# Patient Record
Sex: MALE | Race: WHITE
[De-identification: names, ages, dates, MRNs, and addresses within clinical notes are randomized per-mention and may not be internally consistent; named-entity substitution may affect disease eponyms.]

---

## 2020-11-04 ENCOUNTER — HOSPITAL ENCOUNTER (INPATIENT)
Dept: HOSPITAL 83 - ED | Age: 55
LOS: 2 days | Discharge: NURSING, REHAB OR LONG-TERM CARE | DRG: 189 | End: 2020-11-06
Attending: INTERNAL MEDICINE | Admitting: INTERNAL MEDICINE
Payer: MEDICARE

## 2020-11-04 VITALS — DIASTOLIC BLOOD PRESSURE: 75 MMHG

## 2020-11-04 VITALS — BODY MASS INDEX: 31.64 KG/M2 | WEIGHT: 201.56 LBS | HEIGHT: 66.97 IN

## 2020-11-04 VITALS — DIASTOLIC BLOOD PRESSURE: 77 MMHG

## 2020-11-04 DIAGNOSIS — Z99.81: ICD-10-CM

## 2020-11-04 DIAGNOSIS — R73.9: ICD-10-CM

## 2020-11-04 DIAGNOSIS — Z87.891: ICD-10-CM

## 2020-11-04 DIAGNOSIS — J44.1: ICD-10-CM

## 2020-11-04 DIAGNOSIS — Z82.3: ICD-10-CM

## 2020-11-04 DIAGNOSIS — Z82.49: ICD-10-CM

## 2020-11-04 DIAGNOSIS — F32.9: ICD-10-CM

## 2020-11-04 DIAGNOSIS — Z20.828: ICD-10-CM

## 2020-11-04 DIAGNOSIS — I10: ICD-10-CM

## 2020-11-04 DIAGNOSIS — E66.9: ICD-10-CM

## 2020-11-04 DIAGNOSIS — E44.0: ICD-10-CM

## 2020-11-04 DIAGNOSIS — J96.22: ICD-10-CM

## 2020-11-04 DIAGNOSIS — E87.8: ICD-10-CM

## 2020-11-04 DIAGNOSIS — D53.9: ICD-10-CM

## 2020-11-04 DIAGNOSIS — J96.02: ICD-10-CM

## 2020-11-04 DIAGNOSIS — E87.3: ICD-10-CM

## 2020-11-04 DIAGNOSIS — J96.01: Primary | ICD-10-CM

## 2020-11-04 DIAGNOSIS — Z79.899: ICD-10-CM

## 2020-11-04 LAB
ALBUMIN SERPL-MCNC: 3.3 GM/DL (ref 3.1–4.5)
ALP SERPL-CCNC: 71 U/L (ref 45–117)
ALT SERPL W P-5'-P-CCNC: 32 U/L (ref 12–78)
AST SERPL-CCNC: 28 IU/L (ref 3–35)
BASOPHILS # BLD AUTO: 0 10*3/UL (ref 0–0.1)
BASOPHILS NFR BLD AUTO: 0.4 % (ref 0–1)
BUN SERPL-MCNC: 12 MG/DL (ref 7–24)
CHLORIDE SERPL-SCNC: 95 MMOL/L (ref 98–107)
CREAT SERPL-MCNC: 0.65 MG/DL (ref 0.7–1.3)
EOSINOPHIL # BLD AUTO: 0.3 10*3/UL (ref 0–0.4)
EOSINOPHIL # BLD AUTO: 4.2 % (ref 1–4)
ERYTHROCYTE [DISTWIDTH] IN BLOOD BY AUTOMATED COUNT: 13.4 % (ref 0–14.5)
HCT VFR BLD AUTO: 35.6 % (ref 42–52)
INR BLD: 0.9 (ref 2–3.5)
LYMPHOCYTES # BLD AUTO: 2.1 10*3/UL (ref 1.3–4.4)
LYMPHOCYTES NFR BLD AUTO: 25.3 % (ref 27–41)
MCH RBC QN AUTO: 28.7 PG (ref 27–31)
MCHC RBC AUTO-ENTMCNC: 29.2 G/DL (ref 33–37)
MCV RBC AUTO: 98.1 FL (ref 80–94)
MONOCYTES # BLD AUTO: 0.8 10*3/UL (ref 0.1–1)
MONOCYTES NFR BLD MANUAL: 9.6 % (ref 3–9)
NEUT #: 4.9 10*3/UL (ref 2.3–7.9)
NEUT %: 60.4 % (ref 47–73)
NRBC BLD QL AUTO: 0 % (ref 0–0)
PLATELET # BLD AUTO: 247 10*3/UL (ref 130–400)
PMV BLD AUTO: 10.6 FL (ref 9.6–12.3)
POTASSIUM SERPL-SCNC: 4.4 MMOL/L (ref 3.5–5.1)
PROT SERPL-MCNC: 6.7 GM/DL (ref 6.4–8.2)
RBC # BLD AUTO: 3.63 10*6/UL (ref 4.5–5.9)
SODIUM SERPL-SCNC: 140 MMOL/L (ref 136–145)
TROPONIN I SERPL-MCNC: < 0.015 NG/ML (ref ?–0.04)
WBC NRBC COR # BLD AUTO: 8.1 10*3/UL (ref 4.8–10.8)

## 2020-11-05 VITALS — DIASTOLIC BLOOD PRESSURE: 66 MMHG

## 2020-11-05 VITALS — SYSTOLIC BLOOD PRESSURE: 94 MMHG | DIASTOLIC BLOOD PRESSURE: 71 MMHG

## 2020-11-05 VITALS — DIASTOLIC BLOOD PRESSURE: 71 MMHG

## 2020-11-05 VITALS — DIASTOLIC BLOOD PRESSURE: 44 MMHG

## 2020-11-05 VITALS — DIASTOLIC BLOOD PRESSURE: 83 MMHG

## 2020-11-05 LAB
BASE EXCESS BLDA CALC-SCNC: 13.8 MMOL/L (ref -2–2)
BASE EXCESS BLDA CALC-SCNC: 14.6 MMOL/L (ref -2–2)
BUN SERPL-MCNC: 12 MG/DL (ref 7–24)
CHLORIDE SERPL-SCNC: 92 MMOL/L (ref 98–107)
CREAT SERPL-MCNC: 0.74 MG/DL (ref 0.7–1.3)
ERYTHROCYTE [DISTWIDTH] IN BLOOD BY AUTOMATED COUNT: 13.6 % (ref 0–14.5)
HCT VFR BLD AUTO: 35.2 % (ref 42–52)
MACROCYTES BLD QL SMEAR: SLIGHT
MCH RBC QN AUTO: 28.6 PG (ref 27–31)
MCHC RBC AUTO-ENTMCNC: 29.3 G/DL (ref 33–37)
MCV RBC AUTO: 97.8 FL (ref 80–94)
NRBC BLD QL AUTO: 0 10*3/UL (ref 0–0)
PCO2 BLDA: 78 MMHG (ref 35–45)
PCO2 BLDA: 92 MMHG (ref 35–45)
PH BLDA: 7.3 [PH] (ref 7.35–7.45)
PH BLDA: 7.35 [PH] (ref 7.35–7.45)
PLATELET # BLD AUTO: 246 10*3/UL (ref 130–400)
PLATELET SUFFICIENCY: NORMAL
PMV BLD AUTO: 10.8 FL (ref 9.6–12.3)
PO2 BLDA: 75 MMHG (ref 80–90)
PO2 BLDA: 85 MMHG (ref 80–90)
POTASSIUM SERPL-SCNC: 4.4 MMOL/L (ref 3.5–5.1)
RBC # BLD AUTO: 3.6 10*6/UL (ref 4.5–5.9)
SAO2 % BLDA: 96 % (ref 95–97)
SAO2 % BLDA: 96 % (ref 95–97)
SODIUM SERPL-SCNC: 137 MMOL/L (ref 136–145)
TOTAL CELLS COUNTED: 100 #CELLS
VITAMIN B12: 217 PG/ML (ref 247–911)
WBC NRBC COR # BLD AUTO: 6.3 10*3/UL (ref 4.8–10.8)

## 2020-11-06 VITALS — SYSTOLIC BLOOD PRESSURE: 125 MMHG | DIASTOLIC BLOOD PRESSURE: 75 MMHG

## 2020-11-06 VITALS — DIASTOLIC BLOOD PRESSURE: 56 MMHG

## 2020-11-06 LAB
BASE EXCESS BLDA CALC-SCNC: 15.7 MMOL/L (ref -2–2)
BASOPHILS # BLD AUTO: 0 10*3/UL (ref 0–0.1)
BASOPHILS NFR BLD AUTO: 0.2 % (ref 0–1)
BUN SERPL-MCNC: 15 MG/DL (ref 7–24)
CHLORIDE SERPL-SCNC: 95 MMOL/L (ref 98–107)
CREAT SERPL-MCNC: 0.63 MG/DL (ref 0.7–1.3)
EOSINOPHIL # BLD AUTO: 0 10*3/UL (ref 0–0.4)
EOSINOPHIL # BLD AUTO: 0.3 % (ref 1–4)
ERYTHROCYTE [DISTWIDTH] IN BLOOD BY AUTOMATED COUNT: 13.7 % (ref 0–14.5)
HCT VFR BLD AUTO: 34 % (ref 42–52)
LYMPHOCYTES # BLD AUTO: 2.1 10*3/UL (ref 1.3–4.4)
LYMPHOCYTES NFR BLD AUTO: 17.9 % (ref 27–41)
MCH RBC QN AUTO: 28.2 PG (ref 27–31)
MCHC RBC AUTO-ENTMCNC: 28.5 G/DL (ref 33–37)
MCV RBC AUTO: 98.8 FL (ref 80–94)
MONOCYTES # BLD AUTO: 1.2 10*3/UL (ref 0.1–1)
MONOCYTES NFR BLD MANUAL: 10.2 % (ref 3–9)
NEUT #: 8.4 10*3/UL (ref 2.3–7.9)
NEUT %: 71 % (ref 47–73)
NRBC BLD QL AUTO: 0 % (ref 0–0)
PCO2 BLDA: 89 MMHG (ref 35–45)
PH BLDA: 7.32 [PH] (ref 7.35–7.45)
PLATELET # BLD AUTO: 257 10*3/UL (ref 130–400)
PMV BLD AUTO: 11.2 FL (ref 9.6–12.3)
PO2 BLDA: 68 MMHG (ref 80–90)
POTASSIUM SERPL-SCNC: 4.3 MMOL/L (ref 3.5–5.1)
RBC # BLD AUTO: 3.44 10*6/UL (ref 4.5–5.9)
SAO2 % BLDA: 94 % (ref 95–97)
SODIUM SERPL-SCNC: 141 MMOL/L (ref 136–145)
WBC NRBC COR # BLD AUTO: 11.8 10*3/UL (ref 4.8–10.8)

## 2020-11-06 PROCEDURE — 5A09357 ASSISTANCE WITH RESPIRATORY VENTILATION, LESS THAN 24 CONSECUTIVE HOURS, CONTINUOUS POSITIVE AIRWAY PRESSURE: ICD-10-PCS | Performed by: INTERNAL MEDICINE

## 2020-11-07 ENCOUNTER — HOSPITAL ENCOUNTER (EMERGENCY)
Dept: HOSPITAL 83 - ED | Age: 55
LOS: 1 days | Discharge: OTHER INSTITUTION | End: 2020-11-08
Payer: MEDICARE

## 2020-11-07 VITALS — WEIGHT: 226 LBS | HEIGHT: 71.97 IN | BODY MASS INDEX: 30.61 KG/M2

## 2020-11-07 DIAGNOSIS — Z87.891: ICD-10-CM

## 2020-11-07 DIAGNOSIS — Z79.899: ICD-10-CM

## 2020-11-07 DIAGNOSIS — J44.9: Primary | ICD-10-CM

## 2020-11-25 ENCOUNTER — HOSPITAL ENCOUNTER (OUTPATIENT)
Dept: HOSPITAL 83 - ED | Age: 55
Setting detail: OBSERVATION
LOS: 6 days | Discharge: NURSING, REHAB OR LONG-TERM CARE | End: 2020-12-01
Attending: INTERNAL MEDICINE | Admitting: INTERNAL MEDICINE
Payer: MEDICARE

## 2020-11-25 VITALS — SYSTOLIC BLOOD PRESSURE: 129 MMHG | DIASTOLIC BLOOD PRESSURE: 75 MMHG

## 2020-11-25 VITALS — DIASTOLIC BLOOD PRESSURE: 75 MMHG

## 2020-11-25 VITALS — DIASTOLIC BLOOD PRESSURE: 67 MMHG

## 2020-11-25 VITALS — HEIGHT: 70.87 IN | WEIGHT: 200.62 LBS | BODY MASS INDEX: 28.09 KG/M2

## 2020-11-25 VITALS — DIASTOLIC BLOOD PRESSURE: 69 MMHG

## 2020-11-25 DIAGNOSIS — J96.22: ICD-10-CM

## 2020-11-25 DIAGNOSIS — E44.0: ICD-10-CM

## 2020-11-25 DIAGNOSIS — E87.8: ICD-10-CM

## 2020-11-25 DIAGNOSIS — R55: ICD-10-CM

## 2020-11-25 DIAGNOSIS — R06.89: ICD-10-CM

## 2020-11-25 DIAGNOSIS — J44.1: Primary | ICD-10-CM

## 2020-11-25 DIAGNOSIS — I10: ICD-10-CM

## 2020-11-25 DIAGNOSIS — E83.41: ICD-10-CM

## 2020-11-25 DIAGNOSIS — F32.9: ICD-10-CM

## 2020-11-25 DIAGNOSIS — Z99.81: ICD-10-CM

## 2020-11-25 DIAGNOSIS — D53.9: ICD-10-CM

## 2020-11-25 LAB
ALBUMIN SERPL-MCNC: 3.1 GM/DL (ref 3.1–4.5)
ALP SERPL-CCNC: 80 U/L (ref 45–117)
ALT SERPL W P-5'-P-CCNC: 22 U/L (ref 12–78)
APTT PPP: 20.8 SECONDS (ref 20–32.1)
AST SERPL-CCNC: 17 IU/L (ref 3–35)
BACTERIA #/AREA URNS HPF: (no result) /[HPF]
BASE EXCESS BLDA CALC-SCNC: 14 MMOL/L (ref -2–2)
BASE EXCESS BLDA CALC-SCNC: 16.6 MMOL/L (ref -2–2)
BASOPHILS # BLD AUTO: 0 10*3/UL (ref 0–0.1)
BASOPHILS NFR BLD AUTO: 0.2 % (ref 0–1)
BUN SERPL-MCNC: 17 MG/DL (ref 7–24)
CHLORIDE SERPL-SCNC: 95 MMOL/L (ref 98–107)
CREAT SERPL-MCNC: 0.78 MG/DL (ref 0.7–1.3)
EOSINOPHIL # BLD AUTO: 0.1 10*3/UL (ref 0–0.4)
EOSINOPHIL # BLD AUTO: 0.6 % (ref 1–4)
EPI CELLS #/AREA URNS HPF: (no result) /[HPF]
ERYTHROCYTE [DISTWIDTH] IN BLOOD BY AUTOMATED COUNT: 13.7 % (ref 0–14.5)
HCT VFR BLD AUTO: 34.7 % (ref 42–52)
INR BLD: 0.9 (ref 2–3.5)
LEUKOCYTE ESTERASE UR QL STRIP: (no result)
LYMPHOCYTES # BLD AUTO: 1.3 10*3/UL (ref 1.3–4.4)
LYMPHOCYTES NFR BLD AUTO: 12.4 % (ref 27–41)
MCH RBC QN AUTO: 28.7 PG (ref 27–31)
MCHC RBC AUTO-ENTMCNC: 28.8 G/DL (ref 33–37)
MCV RBC AUTO: 99.4 FL (ref 80–94)
MONOCYTES # BLD AUTO: 0.8 10*3/UL (ref 0.1–1)
MONOCYTES NFR BLD MANUAL: 7.9 % (ref 3–9)
MUCOUS THREADS URNS QL MICRO: (no result)
NEUT #: 8.3 10*3/UL (ref 2.3–7.9)
NEUT %: 78.7 % (ref 47–73)
NRBC BLD QL AUTO: 0 10*3/UL (ref 0–0)
PCO2 BLDA: 77 MMHG (ref 35–45)
PCO2 BLDA: 93 MMHG (ref 35–45)
PH BLDA: 7.32 [PH] (ref 7.35–7.45)
PH BLDA: 7.36 [PH] (ref 7.35–7.45)
PH UR STRIP: 7.5 [PH] (ref 4.5–8)
PLATELET # BLD AUTO: 217 10*3/UL (ref 130–400)
PMV BLD AUTO: 10.8 FL (ref 9.6–12.3)
PO2 BLDA: 57 MMHG (ref 80–90)
PO2 BLDA: 83 MMHG (ref 80–90)
POTASSIUM SERPL-SCNC: 4.3 MMOL/L (ref 3.5–5.1)
PROT SERPL-MCNC: 6.8 GM/DL (ref 6.4–8.2)
RBC # BLD AUTO: 3.49 10*6/UL (ref 4.5–5.9)
RBC #/AREA URNS HPF: (no result) RBC/HPF (ref 0–2)
SAO2 % BLDA: 91 % (ref 95–97)
SAO2 % BLDA: 95 % (ref 95–97)
SODIUM SERPL-SCNC: 140 MMOL/L (ref 136–145)
SP GR UR: >= 1.03 (ref 1–1.03)
TROPONIN I SERPL-MCNC: < 0.015 NG/ML (ref ?–0.04)
UROBILINOGEN UR STRIP-MCNC: 1 E.U./DL (ref 0–1)
WBC #/AREA URNS HPF: (no result) WBC/HPF (ref 0–5)
WBC NRBC COR # BLD AUTO: 10.5 10*3/UL (ref 4.8–10.8)

## 2020-11-25 NOTE — NUR
PT RETURNED FROM CT SCAN. CALLED TO PLACED PT ON BIPAP. PT PLACED ON BIPAP.
TOLERATING WELL. RESPS REGULAR AND UNLABORED.

## 2020-11-25 NOTE — NUR
THE PATIENT REMOVED THE CARDIAC LEADS. WHEN I ASKED HIM WHY HE SAID "CAUSE I
WANTED TOO."  HE THEN POINTED TO THE BIPAP AND SAID "I'M READY TO TAKE THIS
THING OFF TOO."

## 2020-11-25 NOTE — NUR
A 55, admitted to 5E, under the
services of SYMONE Giron DO with a diagnosis of COPD EXACERBATION.
Chief complaint is SOB.
Patient arrived via bed from ER.
Monitor applied. Initial assessment completed.
Vital signs taken and recorded.
SYMONE GIRON DO notified of admission to the unit.
Orders received.
See assessment for past medical history, medications
and allergies.
Patient and/or family oriented to unit. [facility]
visitation policy reviewed.
Clothing/patient valuable form completed.
 
AYDEN POST

## 2020-11-25 NOTE — NUR
PT PLACED ON BIPAP AT THIS TIME. PT TOLERATING WELL. RESPS REGULAR AND
UNLABORED. PARAMETERS: 15/5 40%. SPO2 96% HR 86 .

## 2020-11-25 NOTE — NUR
PT REFUSING TO WEAR CARDIAC MONITOR AND BIPAP AT THIS TIME. PT EDUCATED ON
IMPORTANCE OF BIPAP AND STATES HE WILL WEAR IT TONIGHT TO SLEEP.

## 2020-11-26 VITALS — SYSTOLIC BLOOD PRESSURE: 130 MMHG | DIASTOLIC BLOOD PRESSURE: 76 MMHG

## 2020-11-26 VITALS — SYSTOLIC BLOOD PRESSURE: 119 MMHG | DIASTOLIC BLOOD PRESSURE: 69 MMHG

## 2020-11-26 VITALS — DIASTOLIC BLOOD PRESSURE: 71 MMHG | SYSTOLIC BLOOD PRESSURE: 135 MMHG

## 2020-11-26 VITALS — SYSTOLIC BLOOD PRESSURE: 134 MMHG | DIASTOLIC BLOOD PRESSURE: 76 MMHG

## 2020-11-26 VITALS — SYSTOLIC BLOOD PRESSURE: 102 MMHG | DIASTOLIC BLOOD PRESSURE: 58 MMHG

## 2020-11-26 LAB
BASE EXCESS BLDA CALC-SCNC: 15.1 MMOL/L (ref -2–2)
BASOPHILS # BLD AUTO: 0 10*3/UL (ref 0–0.1)
BASOPHILS NFR BLD AUTO: 0.1 % (ref 0–1)
BUN SERPL-MCNC: 15 MG/DL (ref 7–24)
CHLORIDE SERPL-SCNC: 94 MMOL/L (ref 98–107)
CREAT SERPL-MCNC: 0.8 MG/DL (ref 0.7–1.3)
EOSINOPHIL # BLD AUTO: 0 % (ref 1–4)
EOSINOPHIL # BLD AUTO: 0 10*3/UL (ref 0–0.4)
ERYTHROCYTE [DISTWIDTH] IN BLOOD BY AUTOMATED COUNT: 14 % (ref 0–14.5)
HCT VFR BLD AUTO: 34.3 % (ref 42–52)
LYMPHOCYTES # BLD AUTO: 0.7 10*3/UL (ref 1.3–4.4)
LYMPHOCYTES NFR BLD AUTO: 6.3 % (ref 27–41)
MCH RBC QN AUTO: 28.7 PG (ref 27–31)
MCHC RBC AUTO-ENTMCNC: 28.9 G/DL (ref 33–37)
MCV RBC AUTO: 99.4 FL (ref 80–94)
MONOCYTES # BLD AUTO: 0.4 10*3/UL (ref 0.1–1)
MONOCYTES NFR BLD MANUAL: 3.4 % (ref 3–9)
NEUT #: 10.2 10*3/UL (ref 2.3–7.9)
NEUT %: 89.8 % (ref 47–73)
NRBC BLD QL AUTO: 0 % (ref 0–0)
PCO2 BLDA: 88 MMHG (ref 35–45)
PH BLDA: 7.32 [PH] (ref 7.35–7.45)
PLATELET # BLD AUTO: 221 10*3/UL (ref 130–400)
PMV BLD AUTO: 10.5 FL (ref 9.6–12.3)
PO2 BLDA: 124 MMHG (ref 80–90)
POTASSIUM SERPL-SCNC: 4.8 MMOL/L (ref 3.5–5.1)
RBC # BLD AUTO: 3.45 10*6/UL (ref 4.5–5.9)
SAO2 % BLDA: 98 % (ref 95–97)
SODIUM SERPL-SCNC: 139 MMOL/L (ref 136–145)
TSH SERPL DL<=0.005 MIU/L-ACNC: 0.26 UIU/ML (ref 0.36–4.75)
WBC NRBC COR # BLD AUTO: 11.3 10*3/UL (ref 4.8–10.8)

## 2020-11-26 NOTE — NUR
PT SEEN AND ASSESSED. PT AGREED TO WEAR MONITOR. MONITOR PLACED. HEP LOCK
FLUSHED, SITE SECURED. NO CURRENT C/O AT THIS TIME.

## 2020-11-27 VITALS — DIASTOLIC BLOOD PRESSURE: 56 MMHG

## 2020-11-27 VITALS — SYSTOLIC BLOOD PRESSURE: 96 MMHG | DIASTOLIC BLOOD PRESSURE: 55 MMHG

## 2020-11-27 VITALS — DIASTOLIC BLOOD PRESSURE: 68 MMHG

## 2020-11-27 VITALS — DIASTOLIC BLOOD PRESSURE: 62 MMHG

## 2020-11-27 VITALS — DIASTOLIC BLOOD PRESSURE: 60 MMHG

## 2020-11-27 LAB
BASE EXCESS BLDA CALC-SCNC: 7.6 MMOL/L (ref -2–2)
BUN SERPL-MCNC: 18 MG/DL (ref 7–24)
CHLORIDE SERPL-SCNC: 95 MMOL/L (ref 98–107)
CREAT SERPL-MCNC: 0.91 MG/DL (ref 0.7–1.3)
PCO2 BLDA: 87 MMHG (ref 35–45)
PH BLDA: 7.25 [PH] (ref 7.35–7.45)
PO2 BLDA: 96 MMHG (ref 80–90)
POTASSIUM SERPL-SCNC: 4.3 MMOL/L (ref 3.5–5.1)
SAO2 % BLDA: 97 % (ref 95–97)
SODIUM SERPL-SCNC: 136 MMOL/L (ref 136–145)

## 2020-11-27 NOTE — NUR
PATIENT REQUESTING TO HAVE BIPAP OFF FOR NOW, EDUCATED ON IMPORTANCE OF BIPAP
USE, PATIENT JUST STARES & STATES TO COME BACK IN HALF AN HOUR.

## 2020-11-27 NOTE — NUR
PHYSICAL THERAPY
 
Physical Therapy evaluation completed on 5th floor with full evaluation to
follow.  Recommend physical therapy per plan of care and return to NF/SNF
upon discharge.  Thank you for this referral.
 
 
Teresa Rubio PT

## 2020-11-28 VITALS — DIASTOLIC BLOOD PRESSURE: 50 MMHG | SYSTOLIC BLOOD PRESSURE: 98 MMHG

## 2020-11-28 VITALS — DIASTOLIC BLOOD PRESSURE: 65 MMHG

## 2020-11-28 VITALS — DIASTOLIC BLOOD PRESSURE: 69 MMHG

## 2020-11-28 VITALS — SYSTOLIC BLOOD PRESSURE: 102 MMHG | DIASTOLIC BLOOD PRESSURE: 52 MMHG

## 2020-11-28 VITALS — DIASTOLIC BLOOD PRESSURE: 71 MMHG

## 2020-11-28 LAB
BASE EXCESS BLDA CALC-SCNC: 7.4 MMOL/L (ref -2–2)
BUN SERPL-MCNC: 20 MG/DL (ref 7–24)
CHLORIDE SERPL-SCNC: 100 MMOL/L (ref 98–107)
CREAT SERPL-MCNC: 0.76 MG/DL (ref 0.7–1.3)
PCO2 BLDA: 86 MMHG (ref 35–45)
PH BLDA: 7.25 [PH] (ref 7.35–7.45)
PO2 BLDA: 68 MMHG (ref 80–90)
POTASSIUM SERPL-SCNC: 4.4 MMOL/L (ref 3.5–5.1)
SAO2 % BLDA: 93 % (ref 95–97)
SODIUM SERPL-SCNC: 139 MMOL/L (ref 136–145)

## 2020-11-28 NOTE — NUR
Patient is currently at [facility] and plans to return there
upon discharge. He wears O2 @ 4L nc at the nursing facility. Discharge
planner/ will follow for return to [place].

## 2020-11-28 NOTE — NUR
NOTIFIED REGARDING LOSS OF IV ACCESS AND ALSO REGARDING MUTIPLE
ATTEMPTS/ULTRASOUND. AWAITING PHYSICIAN ORDERS.

## 2020-11-28 NOTE — NUR
SPOKE WITH DR. MACK PERTAINING TO EARLIER ATTEMPTS OF IV ACCESS BEING
UNSUCCESSFUL. DR. MACK STATED TO TRY AGAIN AND IF WE ARE UNABLE TO GET ONE, TO
LET HIM KNOW.

## 2020-11-28 NOTE — NUR
STILL UNABLE TO GAIN IV ACCESS ON PATIENT DESPITE MULTIPLE MORE ATTEMPTS. DR. MACK STATED THAT HE WILL PASS IT ON TO THE AM TEAM.

## 2020-11-29 VITALS — DIASTOLIC BLOOD PRESSURE: 74 MMHG | SYSTOLIC BLOOD PRESSURE: 92 MMHG

## 2020-11-29 VITALS — DIASTOLIC BLOOD PRESSURE: 66 MMHG | SYSTOLIC BLOOD PRESSURE: 111 MMHG

## 2020-11-29 VITALS — DIASTOLIC BLOOD PRESSURE: 73 MMHG

## 2020-11-29 VITALS — DIASTOLIC BLOOD PRESSURE: 56 MMHG | SYSTOLIC BLOOD PRESSURE: 113 MMHG

## 2020-11-29 VITALS — SYSTOLIC BLOOD PRESSURE: 102 MMHG | DIASTOLIC BLOOD PRESSURE: 54 MMHG

## 2020-11-29 LAB
BASE EXCESS BLDA CALC-SCNC: 10.2 MMOL/L (ref -2–2)
BASE EXCESS BLDA CALC-SCNC: 10.4 MMOL/L (ref -2–2)
BASE EXCESS BLDA CALC-SCNC: 8.1 MMOL/L (ref -2–2)
BASE EXCESS BLDA CALC-SCNC: 8.5 MMOL/L (ref -2–2)
BASOPHILS # BLD AUTO: 0 10*3/UL (ref 0–0.1)
BASOPHILS NFR BLD AUTO: 0.2 % (ref 0–1)
BUN SERPL-MCNC: 24 MG/DL (ref 7–24)
CHLORIDE SERPL-SCNC: 102 MMOL/L (ref 98–107)
CREAT SERPL-MCNC: 0.71 MG/DL (ref 0.7–1.3)
EOSINOPHIL # BLD AUTO: 0 10*3/UL (ref 0–0.4)
EOSINOPHIL # BLD AUTO: 0.2 % (ref 1–4)
ERYTHROCYTE [DISTWIDTH] IN BLOOD BY AUTOMATED COUNT: 13.8 % (ref 0–14.5)
HCT VFR BLD AUTO: 40.3 % (ref 42–52)
LYMPHOCYTES # BLD AUTO: 2.4 10*3/UL (ref 1.3–4.4)
LYMPHOCYTES NFR BLD AUTO: 23.4 % (ref 27–41)
MCH RBC QN AUTO: 27.7 PG (ref 27–31)
MCHC RBC AUTO-ENTMCNC: 28.3 G/DL (ref 33–37)
MCV RBC AUTO: 98.1 FL (ref 80–94)
MONOCYTES # BLD AUTO: 1.1 10*3/UL (ref 0.1–1)
MONOCYTES NFR BLD MANUAL: 11.3 % (ref 3–9)
NEUT #: 6.5 10*3/UL (ref 2.3–7.9)
NEUT %: 64.6 % (ref 47–73)
NRBC BLD QL AUTO: 0 10*3/UL (ref 0–0)
PCO2 BLDA: 85 MMHG (ref 35–45)
PCO2 BLDA: 91 MMHG (ref 35–45)
PCO2 BLDA: 91 MMHG (ref 35–45)
PCO2 BLDA: 92 MMHG (ref 35–45)
PH BLDA: 7.24 [PH] (ref 7.35–7.45)
PH BLDA: 7.26 [PH] (ref 7.35–7.45)
PH BLDA: 7.26 [PH] (ref 7.35–7.45)
PH BLDA: 7.27 [PH] (ref 7.35–7.45)
PLATELET # BLD AUTO: 197 10*3/UL (ref 130–400)
PMV BLD AUTO: 11.7 FL (ref 9.6–12.3)
PO2 BLDA: 100 MMHG (ref 80–90)
PO2 BLDA: 110 MMHG (ref 80–90)
PO2 BLDA: 140 MMHG (ref 80–90)
PO2 BLDA: 140 MMHG (ref 80–90)
POTASSIUM SERPL-SCNC: 4 MMOL/L (ref 3.5–5.1)
RBC # BLD AUTO: 4.11 10*6/UL (ref 4.5–5.9)
SAO2 % BLDA: 97 % (ref 95–97)
SAO2 % BLDA: 98 % (ref 95–97)
SAO2 % BLDA: 99 % (ref 95–97)
SAO2 % BLDA: 99 % (ref 95–97)
SODIUM SERPL-SCNC: 137 MMOL/L (ref 136–145)
WBC NRBC COR # BLD AUTO: 10 10*3/UL (ref 4.8–10.8)

## 2020-11-29 NOTE — NUR
DR. MOCTEZUMA CALLED WITH CRITICAL PCO2 ON ABG
RESULTS. TO/RB TO INCREASE IPAP 30 AND REPEAT ABG IN
2HRS.

## 2020-11-29 NOTE — NUR
MAITE FROM RESPIRATORY NOTIFIED REGARDING NEW BIPAP SETTINGS: 24/10. REPEAT
BLOOD GASES ORDERED PER .

## 2020-11-29 NOTE — NUR
CRITICAL PCO2 OF 91 WAS RECIEVED, PER MAITE (RESPIRATORY THERAPIST) STATES HE
WOULD CALL DR. MOCTEZUMA.

## 2020-11-29 NOTE — NUR
DR. MOCTEZUMA CALLED WITH CRITCAL PCO2. NEW ORDER GIVEN AND READ BACK TO REPEAT
ABG AFTER 2HRS MORE HOURS ON BIPAP.

## 2020-11-30 VITALS — DIASTOLIC BLOOD PRESSURE: 67 MMHG | SYSTOLIC BLOOD PRESSURE: 106 MMHG

## 2020-11-30 VITALS — DIASTOLIC BLOOD PRESSURE: 49 MMHG | SYSTOLIC BLOOD PRESSURE: 95 MMHG

## 2020-11-30 VITALS — DIASTOLIC BLOOD PRESSURE: 70 MMHG

## 2020-11-30 VITALS — SYSTOLIC BLOOD PRESSURE: 97 MMHG | DIASTOLIC BLOOD PRESSURE: 64 MMHG

## 2020-11-30 VITALS — DIASTOLIC BLOOD PRESSURE: 61 MMHG

## 2020-11-30 LAB
BASE EXCESS BLDA CALC-SCNC: 12.7 MMOL/L (ref -2–2)
BUN SERPL-MCNC: 20 MG/DL (ref 7–24)
CHLORIDE SERPL-SCNC: 98 MMOL/L (ref 98–107)
CREAT SERPL-MCNC: 0.68 MG/DL (ref 0.7–1.3)
PCO2 BLDA: 86 MMHG (ref 35–45)
PH BLDA: 7.3 [PH] (ref 7.35–7.45)
PO2 BLDA: 112 MMHG (ref 80–90)
POTASSIUM SERPL-SCNC: 3.6 MMOL/L (ref 3.5–5.1)
SAO2 % BLDA: 98 % (ref 95–97)
SODIUM SERPL-SCNC: 138 MMOL/L (ref 136–145)

## 2020-11-30 NOTE — NUR
PHYSICAL THERAPY
Patient was on Bi-pap at 10:26 am. Will check back later with patient.
                                           BARRY HOUSE PTA

## 2020-11-30 NOTE — NUR
SLEEPING, AWAKENS EASILY. RESPIRATIONS EASY. LUNGS DIMINISHED WITH POOR AIR
MOVEMENT. PULSE OX 97% 4L, TITRATED TO 3L. INFREQUENT COUGH. TRACE BLE EDEMA.
CALL LIGHT WITHIN REACH. NO VOICED COMPLAINTS.

## 2020-11-30 NOTE — NUR
OT NOTE
Pt was seen this P.M. 1:1 for 15 minute OT session. Upon arrival pt was supine
in bed. Pt identified by name and  and had no complaints at this time. Pt
presented to therapy with continuous 4L-O2 via NC which he remained on
throughout the entire session.  Pt transferred supine to sit EOB with SBA.
While sitting EOB pt donned B socks with SBA. Sit to stand completed from bed
level with CGA and use of w/w for UE support. Functional mobility completed to
the bathroom with CGA and use of w/w. There he transferred on/off standard
commode with CGA and use of grab bar for UE support. Functional mobility
completed back out into the room with CGA and use of w/w. Throughout all
mobility pt required constant verbal and tactile prompts for O2 line safety
and navigation increasing risk of falls, pt presented with poor carry over. Pt
tolerated aprox 4 minutes of activity before requesting to sit due to quick
onset of fatigue. Pt's SpO2 dropped to 80% and within aprox 2 minutes SpO2
raised to 90%. Pt transferred sit to supine with SBA. There he was left with
call light in hand, tray table in place, and phone in reach. Continue with rec
D/C plan to return to SNF.
 
JULIO Parson/YANET

## 2020-11-30 NOTE — NUR
PATIENT IS SHORT TERM AT [facility]. PATIENT CAN RETURN WHEN MEDICALLY STABLE. LSW
FAXED UPDATES TO [facility] FOR REVIEW.

## 2020-11-30 NOTE — NUR
OT NOTE
Attempted to see pt this A.M. for OT session and upon arrival pt was supine in
bed with BIPAP on. Will check back at a later time/date and continue with POC
as able.
 
JULIO Parson/YANET

## 2020-11-30 NOTE — NUR
PHYSICAL THERAPY
Patient presented to therapy in supine with head of bed elevated and bed alarm
on. Patient is on 4 liters of spO2 VIA NASAL CANULA. Patient has no
complaints. Patient gives informed consent for treatment. Patient was
identified by name and  on wristband. Patient performed supine <> sitting
on EOB with SBA. Patient sat on EOB with SBA. Patient completed STS <> EOB
with SBA. Patient ambulated 40' x 1 with Wh Walker and Close Supervision and
assistance with O2 line management. Patient STS from low chair with SBA.
Patient transferred sit EOB to Supine with SBA. Patient was left in supine in
bed with head of bed elevated, call light within reach and bed alarm on. O2
SAT AT down to 72% and it climbed to 91% and pulse of 108. Patient was 1:1
with this PTA for 15 minutes total.
                                           BARRY HOUSE PTA

## 2020-12-01 VITALS — DIASTOLIC BLOOD PRESSURE: 46 MMHG | SYSTOLIC BLOOD PRESSURE: 94 MMHG

## 2020-12-01 VITALS — DIASTOLIC BLOOD PRESSURE: 68 MMHG | SYSTOLIC BLOOD PRESSURE: 108 MMHG

## 2020-12-01 LAB
BASE EXCESS BLDA CALC-SCNC: 9.6 MMOL/L (ref -2–2)
BASOPHILS # BLD AUTO: 0 10*3/UL (ref 0–0.1)
BASOPHILS NFR BLD AUTO: 0.1 % (ref 0–1)
BUN SERPL-MCNC: 16 MG/DL (ref 7–24)
CHLORIDE SERPL-SCNC: 99 MMOL/L (ref 98–107)
CREAT SERPL-MCNC: 0.68 MG/DL (ref 0.7–1.3)
EOSINOPHIL # BLD AUTO: 0.1 10*3/UL (ref 0–0.4)
EOSINOPHIL # BLD AUTO: 0.5 % (ref 1–4)
ERYTHROCYTE [DISTWIDTH] IN BLOOD BY AUTOMATED COUNT: 13.8 % (ref 0–14.5)
HCT VFR BLD AUTO: 32.5 % (ref 42–52)
LYMPHOCYTES # BLD AUTO: 2.4 10*3/UL (ref 1.3–4.4)
LYMPHOCYTES NFR BLD AUTO: 19.7 % (ref 27–41)
MCH RBC QN AUTO: 28.9 PG (ref 27–31)
MCHC RBC AUTO-ENTMCNC: 29.5 G/DL (ref 33–37)
MCV RBC AUTO: 97.9 FL (ref 80–94)
MONOCYTES # BLD AUTO: 1.3 10*3/UL (ref 0.1–1)
MONOCYTES NFR BLD MANUAL: 10.2 % (ref 3–9)
NEUT #: 8.5 10*3/UL (ref 2.3–7.9)
NEUT %: 69.1 % (ref 47–73)
NRBC BLD QL AUTO: 0 % (ref 0–0)
PCO2 BLDA: 72 MMHG (ref 35–45)
PH BLDA: 7.33 [PH] (ref 7.35–7.45)
PLATELET # BLD AUTO: 240 10*3/UL (ref 130–400)
PMV BLD AUTO: 11 FL (ref 9.6–12.3)
PO2 BLDA: 70 MMHG (ref 80–90)
POTASSIUM SERPL-SCNC: 3.5 MMOL/L (ref 3.5–5.1)
RBC # BLD AUTO: 3.32 10*6/UL (ref 4.5–5.9)
SAO2 % BLDA: 95 % (ref 95–97)
SODIUM SERPL-SCNC: 142 MMOL/L (ref 136–145)
WBC NRBC COR # BLD AUTO: 12.3 10*3/UL (ref 4.8–10.8)

## 2020-12-01 NOTE — NUR
OT NOTE
Pt was seen this A.M. 1:1 for 15 minute OT session. Upon arrival pt was supine
in bed. Pt identified by name and  and had no complaints at this time. Pt
presented to therapy with continuous 3L-O2 via NC which he remained on
throughout the entire session. Pt transferred supine to sit EOB with SBA.
While sitting EOB pt reached for slippers that were under the bed with SBA and
then donned B slippers with supervision. Sit to stand completed from bed level
with SBA and use of w/w for UE support. Functional mobility completed to the
bathroom and back around the room with SBA and use of w/w. Throughout pt
required mod verbal prompts for safety with the O2 line to decrease risk of
falls. Pt was able to tolerate aprox 4 minutes of activity before requesting
to sit. Pt's dynamic standing balance was then challenged while weight
shifting, crossing midline, and reaching over all planes. Pt was able to
maintain F/F- standing balance throughout. Pt was left sitting upright in the
recliner with call light in hand, tray table in place, and body alarm
activated for safety. Continue with rec D/C plan to return to SNF.
 
JULIO Parson/YANET

## 2020-12-01 NOTE — NUR
PHYSICAL THERAPY
Patient presented to therapy in supine with head of bed elevated and bed alrm
on. Patient is on 4 liters of spO2 VIA NASAL CANULA. Patient gives informed
consent for treatment. Patient was identified by name and  on wristband.
Patient completed supine to sitting at EOB with SBA. Patient sat on EOB with
Supervision. Patient performed STS <> EOB with SBA. Patient ambulated with
Wh Walker and Close Supervision for 38' x 1 with no LOB and no SOB. Patient
managed the O2 hose very well and did not trip or become entangled in he line.
Patient STS <> bedside chair with SBA. Patient declined LE ther ex for
strengthening. Patient was left in bedside chair with call light within reach
and chair alarm tested and attached to the patient. Patient was 1:1 with THIS
PTA for 16 minuted total.
                                       BARRY HOUSE PTA

## 2020-12-01 NOTE — NUR
Discharge instructions reviewed with patient/family. Patient receptive and
verbalizes understanding. Follow-up care arranged. Written instructions given
to patient/family.
JOHN MEYER

## 2020-12-01 NOTE — NUR
LSW NOTIFIED OF PATIENT DISCHARGE. LSW SPOKE WITH MIKAEL GOODWIN. LSW CONTACTED
AND ARRANGED [facility] TO TRANSPORT THE PATIENT AT 1PM TODAY. LSW
NOTIFIED CONNIE MISHRA, [facility], AND PATIENTS DAUGHTER TORRI. LSW TO FAX
DISCHARGE ORDERS TO [facility].

## 2020-12-01 NOTE — NUR
patient is at [facility] for skilled care and rehab. he will
return when discharged. case management/ will follow

## 2020-12-01 NOTE — NUR
SLEEPING. NO ACUTE DISTRESS NOTED. RESPIRATIONS EASY. PULSE OX 99% BI-PAP.
VSS. CALL LIGHT WITHIN REACH.

## 2020-12-02 NOTE — NUR
OCCUPATIONAL THERAPY CO-SIGN
 
I approve of the Occupational Therapy notes written above.
 
LORETO COUGHLIN, OTR/L

## 2020-12-02 NOTE — NUR
PHYSICAL THERAPY CO-SIGN
 
 
I approve of the Physical Therapy notes written above.
 
 
Teresa Rubio PT

## 2020-12-21 ENCOUNTER — HOSPITAL ENCOUNTER (INPATIENT)
Dept: HOSPITAL 83 - ED | Age: 55
LOS: 17 days | Discharge: HOSPICE | DRG: 870 | End: 2021-01-07
Attending: INTERNAL MEDICINE | Admitting: INTERNAL MEDICINE
Payer: MEDICARE

## 2020-12-21 VITALS — DIASTOLIC BLOOD PRESSURE: 80 MMHG | SYSTOLIC BLOOD PRESSURE: 132 MMHG

## 2020-12-21 VITALS — WEIGHT: 196 LBS | BODY MASS INDEX: 28.06 KG/M2 | HEIGHT: 70 IN

## 2020-12-21 VITALS — DIASTOLIC BLOOD PRESSURE: 75 MMHG

## 2020-12-21 VITALS — DIASTOLIC BLOOD PRESSURE: 36 MMHG

## 2020-12-21 DIAGNOSIS — I48.0: ICD-10-CM

## 2020-12-21 DIAGNOSIS — D68.59: ICD-10-CM

## 2020-12-21 DIAGNOSIS — J96.21: ICD-10-CM

## 2020-12-21 DIAGNOSIS — E87.0: ICD-10-CM

## 2020-12-21 DIAGNOSIS — E87.3: ICD-10-CM

## 2020-12-21 DIAGNOSIS — Z99.81: ICD-10-CM

## 2020-12-21 DIAGNOSIS — E43: ICD-10-CM

## 2020-12-21 DIAGNOSIS — J96.22: ICD-10-CM

## 2020-12-21 DIAGNOSIS — Z82.49: ICD-10-CM

## 2020-12-21 DIAGNOSIS — D53.9: ICD-10-CM

## 2020-12-21 DIAGNOSIS — N18.9: ICD-10-CM

## 2020-12-21 DIAGNOSIS — E11.22: ICD-10-CM

## 2020-12-21 DIAGNOSIS — E44.0: ICD-10-CM

## 2020-12-21 DIAGNOSIS — Z51.5: ICD-10-CM

## 2020-12-21 DIAGNOSIS — U07.1: ICD-10-CM

## 2020-12-21 DIAGNOSIS — A41.9: Primary | ICD-10-CM

## 2020-12-21 DIAGNOSIS — Z91.19: ICD-10-CM

## 2020-12-21 DIAGNOSIS — J12.82: ICD-10-CM

## 2020-12-21 DIAGNOSIS — N17.0: ICD-10-CM

## 2020-12-21 DIAGNOSIS — R65.21: ICD-10-CM

## 2020-12-21 DIAGNOSIS — E87.6: ICD-10-CM

## 2020-12-21 DIAGNOSIS — Z79.01: ICD-10-CM

## 2020-12-21 DIAGNOSIS — I12.9: ICD-10-CM

## 2020-12-21 DIAGNOSIS — Z66: ICD-10-CM

## 2020-12-21 DIAGNOSIS — F33.1: ICD-10-CM

## 2020-12-21 DIAGNOSIS — G93.41: ICD-10-CM

## 2020-12-21 DIAGNOSIS — J44.1: ICD-10-CM

## 2020-12-21 DIAGNOSIS — I21.4: ICD-10-CM

## 2020-12-21 DIAGNOSIS — E11.65: ICD-10-CM

## 2020-12-21 LAB
ALBUMIN SERPL-MCNC: 2.9 GM/DL (ref 3.1–4.5)
ALP SERPL-CCNC: 70 U/L (ref 45–117)
ALT SERPL W P-5'-P-CCNC: 24 U/L (ref 12–78)
AST SERPL-CCNC: 22 IU/L (ref 3–35)
BASOPHILS # BLD AUTO: 0 10*3/UL (ref 0–0.1)
BASOPHILS NFR BLD AUTO: 0.1 % (ref 0–1)
BUN SERPL-MCNC: 19 MG/DL (ref 7–24)
CHLORIDE SERPL-SCNC: 102 MMOL/L (ref 98–107)
CREAT SERPL-MCNC: 0.92 MG/DL (ref 0.7–1.3)
EOSINOPHIL # BLD AUTO: 0 10*3/UL (ref 0–0.4)
EOSINOPHIL # BLD AUTO: 0.3 % (ref 1–4)
ERYTHROCYTE [DISTWIDTH] IN BLOOD BY AUTOMATED COUNT: 14.1 % (ref 0–14.5)
HCT VFR BLD AUTO: 37.6 % (ref 42–52)
LYMPHOCYTES # BLD AUTO: 1 10*3/UL (ref 1.3–4.4)
LYMPHOCYTES NFR BLD AUTO: 10.3 % (ref 27–41)
MCH RBC QN AUTO: 27.8 PG (ref 27–31)
MCHC RBC AUTO-ENTMCNC: 28.7 G/DL (ref 33–37)
MCV RBC AUTO: 96.9 FL (ref 80–94)
MONOCYTES # BLD AUTO: 0.7 10*3/UL (ref 0.1–1)
MONOCYTES NFR BLD MANUAL: 7.1 % (ref 3–9)
NEUT #: 7.7 10*3/UL (ref 2.3–7.9)
NEUT %: 81.9 % (ref 47–73)
NRBC BLD QL AUTO: 0 % (ref 0–0)
PLATELET # BLD AUTO: 177 10*3/UL (ref 130–400)
PMV BLD AUTO: 11.3 FL (ref 9.6–12.3)
POTASSIUM SERPL-SCNC: 4 MMOL/L (ref 3.5–5.1)
PROT SERPL-MCNC: 6.9 GM/DL (ref 6.4–8.2)
RBC # BLD AUTO: 3.88 10*6/UL (ref 4.5–5.9)
SODIUM SERPL-SCNC: 137 MMOL/L (ref 136–145)
WBC NRBC COR # BLD AUTO: 9.4 10*3/UL (ref 4.8–10.8)

## 2020-12-22 VITALS — SYSTOLIC BLOOD PRESSURE: 68 MMHG | DIASTOLIC BLOOD PRESSURE: 45 MMHG

## 2020-12-22 VITALS — SYSTOLIC BLOOD PRESSURE: 131 MMHG | DIASTOLIC BLOOD PRESSURE: 78 MMHG

## 2020-12-22 VITALS — DIASTOLIC BLOOD PRESSURE: 65 MMHG | SYSTOLIC BLOOD PRESSURE: 140 MMHG

## 2020-12-22 VITALS — DIASTOLIC BLOOD PRESSURE: 77 MMHG

## 2020-12-22 VITALS — DIASTOLIC BLOOD PRESSURE: 60 MMHG

## 2020-12-22 VITALS — DIASTOLIC BLOOD PRESSURE: 68 MMHG

## 2020-12-22 VITALS — SYSTOLIC BLOOD PRESSURE: 129 MMHG | DIASTOLIC BLOOD PRESSURE: 71 MMHG

## 2020-12-22 VITALS — DIASTOLIC BLOOD PRESSURE: 73 MMHG

## 2020-12-22 VITALS — SYSTOLIC BLOOD PRESSURE: 97 MMHG | DIASTOLIC BLOOD PRESSURE: 65 MMHG

## 2020-12-22 VITALS — DIASTOLIC BLOOD PRESSURE: 70 MMHG

## 2020-12-22 VITALS — DIASTOLIC BLOOD PRESSURE: 50 MMHG

## 2020-12-22 VITALS — DIASTOLIC BLOOD PRESSURE: 66 MMHG

## 2020-12-22 LAB
BACTERIA #/AREA URNS HPF: (no result) /[HPF]
BASE EXCESS BLDA CALC-SCNC: 3.9 MMOL/L (ref -2–2)
BASE EXCESS BLDA CALC-SCNC: 4 MMOL/L (ref -2–2)
BASE EXCESS BLDA CALC-SCNC: 4.3 MMOL/L (ref -2–2)
BASE EXCESS BLDA CALC-SCNC: 4.8 MMOL/L (ref -2–2)
CASTS URNS QL MICRO: (no result)
EPI CELLS #/AREA URNS HPF: (no result) /[HPF]
PCO2 BLDA: 104 MMHG (ref 35–45)
PCO2 BLDA: 65 MMHG (ref 35–45)
PCO2 BLDA: 88 MMHG (ref 35–45)
PCO2 BLDA: 98 MMHG (ref 35–45)
PH BLDA: 7.16 [PH] (ref 7.35–7.45)
PH BLDA: 7.18 [PH] (ref 7.35–7.45)
PH BLDA: 7.21 [PH] (ref 7.35–7.45)
PH BLDA: 7.31 [PH] (ref 7.35–7.45)
PH UR STRIP: 5 [PH] (ref 4.5–8)
PO2 BLDA: 108 MMHG (ref 80–90)
PO2 BLDA: 117 MMHG (ref 80–90)
PO2 BLDA: 81 MMHG (ref 80–90)
PO2 BLDA: 98 MMHG (ref 80–90)
SAO2 % BLDA: 94 % (ref 95–97)
SAO2 % BLDA: 97 % (ref 95–97)
SAO2 % BLDA: 97 % (ref 95–97)
SAO2 % BLDA: 99 % (ref 95–97)
SP GR UR: >= 1.03 (ref 1–1.03)
UROBILINOGEN UR STRIP-MCNC: 1 E.U./DL (ref 0–1)

## 2020-12-22 PROCEDURE — 5A09357 ASSISTANCE WITH RESPIRATORY VENTILATION, LESS THAN 24 CONSECUTIVE HOURS, CONTINUOUS POSITIVE AIRWAY PRESSURE: ICD-10-PCS | Performed by: INTERNAL MEDICINE

## 2020-12-22 PROCEDURE — 0BH17EZ INSERTION OF ENDOTRACHEAL AIRWAY INTO TRACHEA, VIA NATURAL OR ARTIFICIAL OPENING: ICD-10-PCS | Performed by: NURSE ANESTHETIST, CERTIFIED REGISTERED

## 2020-12-22 PROCEDURE — 5A1955Z RESPIRATORY VENTILATION, GREATER THAN 96 CONSECUTIVE HOURS: ICD-10-PCS | Performed by: INTERNAL MEDICINE

## 2020-12-22 PROCEDURE — 4A133J1 MONITORING OF ARTERIAL PULSE, PERIPHERAL, PERCUTANEOUS APPROACH: ICD-10-PCS | Performed by: NURSE ANESTHETIST, CERTIFIED REGISTERED

## 2020-12-22 PROCEDURE — 4A133B1 MONITORING OF ARTERIAL PRESSURE, PERIPHERAL, PERCUTANEOUS APPROACH: ICD-10-PCS | Performed by: NURSE ANESTHETIST, CERTIFIED REGISTERED

## 2020-12-22 PROCEDURE — 03HY32Z INSERTION OF MONITORING DEVICE INTO UPPER ARTERY, PERCUTANEOUS APPROACH: ICD-10-PCS | Performed by: NURSE ANESTHETIST, CERTIFIED REGISTERED

## 2020-12-22 NOTE — NUR
RESPIRATORY CALLED DR MOCTEZUMA HE ADVISED CHANGE BIPAP SETTING TO 30/10 REPEAT THE
BLOOD GAS IN 2 HOURS AND CALL HIM BACK

## 2020-12-22 NOTE — NUR
I SPOKE WITH THE DAUGHTER AN UPDATED HER ON THE PATIENT.   I WENT INTO THE ROOM
TO LET THE PATIENT KNOW THAT HIS DAUGHTER HOW BEEN CALLING.
SHE WANTED ME TO LET HIM KNOW THAT.

## 2020-12-22 NOTE — NUR
INTUBATION BY JANE CHEN. 8.0CM TUBE AT 24 AT THE TEETH. 600ML, FIO2 40%, RATE
18, PEEP 5, PEAK PRESSURE 45-50. MV 11.1

## 2020-12-22 NOTE — NUR
VERBAL CONSENT FROM THE PATIENT TO SPEAK WITH HIS DAUGHTER LEXIE SAMUEL.  THE
DAUGHTER WAS UPDATED OF HIS CONDITION.

## 2020-12-23 VITALS — DIASTOLIC BLOOD PRESSURE: 67 MMHG

## 2020-12-23 VITALS — SYSTOLIC BLOOD PRESSURE: 126 MMHG | DIASTOLIC BLOOD PRESSURE: 65 MMHG

## 2020-12-23 VITALS — SYSTOLIC BLOOD PRESSURE: 116 MMHG | DIASTOLIC BLOOD PRESSURE: 60 MMHG

## 2020-12-23 VITALS — SYSTOLIC BLOOD PRESSURE: 102 MMHG | DIASTOLIC BLOOD PRESSURE: 54 MMHG

## 2020-12-23 VITALS — DIASTOLIC BLOOD PRESSURE: 55 MMHG

## 2020-12-23 VITALS — DIASTOLIC BLOOD PRESSURE: 54 MMHG

## 2020-12-23 VITALS — DIASTOLIC BLOOD PRESSURE: 60 MMHG | SYSTOLIC BLOOD PRESSURE: 106 MMHG

## 2020-12-23 VITALS — DIASTOLIC BLOOD PRESSURE: 71 MMHG

## 2020-12-23 VITALS — DIASTOLIC BLOOD PRESSURE: 62 MMHG

## 2020-12-23 VITALS — DIASTOLIC BLOOD PRESSURE: 79 MMHG

## 2020-12-23 VITALS — DIASTOLIC BLOOD PRESSURE: 54 MMHG | SYSTOLIC BLOOD PRESSURE: 105 MMHG

## 2020-12-23 VITALS — DIASTOLIC BLOOD PRESSURE: 61 MMHG

## 2020-12-23 VITALS — DIASTOLIC BLOOD PRESSURE: 60 MMHG | SYSTOLIC BLOOD PRESSURE: 104 MMHG

## 2020-12-23 VITALS — DIASTOLIC BLOOD PRESSURE: 55 MMHG | SYSTOLIC BLOOD PRESSURE: 100 MMHG

## 2020-12-23 VITALS — DIASTOLIC BLOOD PRESSURE: 64 MMHG

## 2020-12-23 VITALS — DIASTOLIC BLOOD PRESSURE: 60 MMHG | SYSTOLIC BLOOD PRESSURE: 107 MMHG

## 2020-12-23 VITALS — DIASTOLIC BLOOD PRESSURE: 58 MMHG | SYSTOLIC BLOOD PRESSURE: 109 MMHG

## 2020-12-23 VITALS — SYSTOLIC BLOOD PRESSURE: 108 MMHG | DIASTOLIC BLOOD PRESSURE: 56 MMHG

## 2020-12-23 VITALS — SYSTOLIC BLOOD PRESSURE: 122 MMHG | DIASTOLIC BLOOD PRESSURE: 63 MMHG

## 2020-12-23 VITALS — SYSTOLIC BLOOD PRESSURE: 120 MMHG | DIASTOLIC BLOOD PRESSURE: 62 MMHG

## 2020-12-23 VITALS — DIASTOLIC BLOOD PRESSURE: 70 MMHG | SYSTOLIC BLOOD PRESSURE: 118 MMHG

## 2020-12-23 VITALS — DIASTOLIC BLOOD PRESSURE: 56 MMHG | SYSTOLIC BLOOD PRESSURE: 105 MMHG

## 2020-12-23 VITALS — DIASTOLIC BLOOD PRESSURE: 56 MMHG

## 2020-12-23 VITALS — SYSTOLIC BLOOD PRESSURE: 101 MMHG | DIASTOLIC BLOOD PRESSURE: 54 MMHG

## 2020-12-23 VITALS — SYSTOLIC BLOOD PRESSURE: 138 MMHG | DIASTOLIC BLOOD PRESSURE: 70 MMHG

## 2020-12-23 VITALS — DIASTOLIC BLOOD PRESSURE: 57 MMHG

## 2020-12-23 VITALS — SYSTOLIC BLOOD PRESSURE: 127 MMHG | DIASTOLIC BLOOD PRESSURE: 65 MMHG

## 2020-12-23 VITALS — DIASTOLIC BLOOD PRESSURE: 65 MMHG | SYSTOLIC BLOOD PRESSURE: 123 MMHG

## 2020-12-23 VITALS — DIASTOLIC BLOOD PRESSURE: 59 MMHG | SYSTOLIC BLOOD PRESSURE: 112 MMHG

## 2020-12-23 VITALS — DIASTOLIC BLOOD PRESSURE: 66 MMHG

## 2020-12-23 VITALS — SYSTOLIC BLOOD PRESSURE: 99 MMHG | DIASTOLIC BLOOD PRESSURE: 56 MMHG

## 2020-12-23 VITALS — SYSTOLIC BLOOD PRESSURE: 106 MMHG | DIASTOLIC BLOOD PRESSURE: 56 MMHG

## 2020-12-23 VITALS — SYSTOLIC BLOOD PRESSURE: 108 MMHG | DIASTOLIC BLOOD PRESSURE: 61 MMHG

## 2020-12-23 VITALS — DIASTOLIC BLOOD PRESSURE: 63 MMHG

## 2020-12-23 VITALS — SYSTOLIC BLOOD PRESSURE: 128 MMHG | DIASTOLIC BLOOD PRESSURE: 64 MMHG

## 2020-12-23 VITALS — SYSTOLIC BLOOD PRESSURE: 107 MMHG | DIASTOLIC BLOOD PRESSURE: 56 MMHG

## 2020-12-23 VITALS — SYSTOLIC BLOOD PRESSURE: 108 MMHG | DIASTOLIC BLOOD PRESSURE: 64 MMHG

## 2020-12-23 VITALS — SYSTOLIC BLOOD PRESSURE: 103 MMHG | DIASTOLIC BLOOD PRESSURE: 55 MMHG

## 2020-12-23 VITALS — DIASTOLIC BLOOD PRESSURE: 75 MMHG | SYSTOLIC BLOOD PRESSURE: 111 MMHG

## 2020-12-23 VITALS — DIASTOLIC BLOOD PRESSURE: 59 MMHG | SYSTOLIC BLOOD PRESSURE: 104 MMHG

## 2020-12-23 VITALS — DIASTOLIC BLOOD PRESSURE: 58 MMHG | SYSTOLIC BLOOD PRESSURE: 112 MMHG

## 2020-12-23 VITALS — DIASTOLIC BLOOD PRESSURE: 63 MMHG | SYSTOLIC BLOOD PRESSURE: 122 MMHG

## 2020-12-23 VITALS — DIASTOLIC BLOOD PRESSURE: 70 MMHG | SYSTOLIC BLOOD PRESSURE: 126 MMHG

## 2020-12-23 VITALS — DIASTOLIC BLOOD PRESSURE: 60 MMHG

## 2020-12-23 VITALS — DIASTOLIC BLOOD PRESSURE: 65 MMHG

## 2020-12-23 VITALS — SYSTOLIC BLOOD PRESSURE: 124 MMHG | DIASTOLIC BLOOD PRESSURE: 63 MMHG

## 2020-12-23 VITALS — DIASTOLIC BLOOD PRESSURE: 66 MMHG | SYSTOLIC BLOOD PRESSURE: 129 MMHG

## 2020-12-23 VITALS — DIASTOLIC BLOOD PRESSURE: 64 MMHG | SYSTOLIC BLOOD PRESSURE: 124 MMHG

## 2020-12-23 VITALS — DIASTOLIC BLOOD PRESSURE: 59 MMHG

## 2020-12-23 VITALS — DIASTOLIC BLOOD PRESSURE: 55 MMHG | SYSTOLIC BLOOD PRESSURE: 101 MMHG

## 2020-12-23 VITALS — DIASTOLIC BLOOD PRESSURE: 78 MMHG

## 2020-12-23 LAB
ALBUMIN SERPL-MCNC: 2.6 GM/DL (ref 3.1–4.5)
ALP SERPL-CCNC: 61 U/L (ref 45–117)
ALT SERPL W P-5'-P-CCNC: 21 U/L (ref 12–78)
AST SERPL-CCNC: 20 IU/L (ref 3–35)
BASE EXCESS BLDA CALC-SCNC: 6.8 MMOL/L (ref -2–2)
BASOPHILS # BLD AUTO: 0 10*3/UL (ref 0–0.1)
BASOPHILS NFR BLD AUTO: 0 % (ref 0–1)
BUN SERPL-MCNC: 38 MG/DL (ref 7–24)
CHLORIDE SERPL-SCNC: 102 MMOL/L (ref 98–107)
CK SERPL-CCNC: 70 U/L (ref 39–308)
CREAT SERPL-MCNC: 1.68 MG/DL (ref 0.7–1.3)
EOSINOPHIL # BLD AUTO: 0 % (ref 1–4)
EOSINOPHIL # BLD AUTO: 0 10*3/UL (ref 0–0.4)
ERYTHROCYTE [DISTWIDTH] IN BLOOD BY AUTOMATED COUNT: 14 % (ref 0–14.5)
HCT VFR BLD AUTO: 34 % (ref 42–52)
LDH SERPL-CCNC: 211 U/L (ref 87–241)
LYMPHOCYTES # BLD AUTO: 0.5 10*3/UL (ref 1.3–4.4)
LYMPHOCYTES NFR BLD AUTO: 7.4 % (ref 27–41)
MCH RBC QN AUTO: 27.8 PG (ref 27–31)
MCHC RBC AUTO-ENTMCNC: 29.1 G/DL (ref 33–37)
MCV RBC AUTO: 95.5 FL (ref 80–94)
MONOCYTES # BLD AUTO: 0.7 10*3/UL (ref 0.1–1)
MONOCYTES NFR BLD MANUAL: 10.7 % (ref 3–9)
NEUT #: 5.3 10*3/UL (ref 2.3–7.9)
NEUT %: 81.6 % (ref 47–73)
NRBC BLD QL AUTO: 0 % (ref 0–0)
PCO2 BLDA: 51 MMHG (ref 35–45)
PH BLDA: 7.41 [PH] (ref 7.35–7.45)
PLATELET # BLD AUTO: 169 10*3/UL (ref 130–400)
PMV BLD AUTO: 11.6 FL (ref 9.6–12.3)
PO2 BLDA: 70 MMHG (ref 80–90)
POTASSIUM SERPL-SCNC: 4.5 MMOL/L (ref 3.5–5.1)
PROT SERPL-MCNC: 6.3 GM/DL (ref 6.4–8.2)
RBC # BLD AUTO: 3.56 10*6/UL (ref 4.5–5.9)
SAO2 % BLDA: 96 % (ref 95–97)
SODIUM SERPL-SCNC: 138 MMOL/L (ref 136–145)
WBC NRBC COR # BLD AUTO: 6.5 10*3/UL (ref 4.8–10.8)

## 2020-12-23 NOTE — NUR
PHYSICAL THERAPY
 
PT evaluation received and chart reviewed. Patient intubated and not
appropriate for skilled PT evaluation at this time. Discharge PT orders.
Recommend new PT orders when medically appropriate. Thank you. Jud Gloria,PT,DPT

## 2020-12-23 NOTE — NUR
LSW FAXED UPDATES TO [facility]. PATIENT CAN RETURN TO OE WHEN MEDICALLY
STABLE.

## 2020-12-23 NOTE — NUR
OT NOTE
Occupational therapy order received and chart reviewed. Patient is currently
on a vent. No further OT indicated at this time. Will discharge orders. Thank
you.
 
Maria M Sanchez, OTR/L

## 2020-12-23 NOTE — NUR
RIGHT EJ CENTRAL LINE PLACED BY JANE HOFFMANN.  MOUTH CARE PROVIDED, ET TUBE
SUCTIONED AT THIS TIME AS WELL AS MOUTH.  CHANGED BEDDING AT THIS TIME. 
REPOSITIONED AT THIS TIME.

## 2020-12-24 VITALS — DIASTOLIC BLOOD PRESSURE: 66 MMHG | SYSTOLIC BLOOD PRESSURE: 116 MMHG

## 2020-12-24 VITALS — SYSTOLIC BLOOD PRESSURE: 119 MMHG | DIASTOLIC BLOOD PRESSURE: 67 MMHG

## 2020-12-24 VITALS — SYSTOLIC BLOOD PRESSURE: 93 MMHG | DIASTOLIC BLOOD PRESSURE: 54 MMHG

## 2020-12-24 VITALS — DIASTOLIC BLOOD PRESSURE: 67 MMHG

## 2020-12-24 VITALS — SYSTOLIC BLOOD PRESSURE: 118 MMHG | DIASTOLIC BLOOD PRESSURE: 59 MMHG

## 2020-12-24 VITALS — DIASTOLIC BLOOD PRESSURE: 58 MMHG

## 2020-12-24 VITALS — SYSTOLIC BLOOD PRESSURE: 126 MMHG | DIASTOLIC BLOOD PRESSURE: 60 MMHG

## 2020-12-24 VITALS — DIASTOLIC BLOOD PRESSURE: 62 MMHG | SYSTOLIC BLOOD PRESSURE: 109 MMHG

## 2020-12-24 VITALS — DIASTOLIC BLOOD PRESSURE: 57 MMHG | SYSTOLIC BLOOD PRESSURE: 109 MMHG

## 2020-12-24 VITALS — DIASTOLIC BLOOD PRESSURE: 56 MMHG

## 2020-12-24 VITALS — DIASTOLIC BLOOD PRESSURE: 65 MMHG | SYSTOLIC BLOOD PRESSURE: 110 MMHG

## 2020-12-24 VITALS — SYSTOLIC BLOOD PRESSURE: 114 MMHG | DIASTOLIC BLOOD PRESSURE: 58 MMHG

## 2020-12-24 VITALS — DIASTOLIC BLOOD PRESSURE: 66 MMHG | SYSTOLIC BLOOD PRESSURE: 131 MMHG

## 2020-12-24 VITALS — SYSTOLIC BLOOD PRESSURE: 114 MMHG | DIASTOLIC BLOOD PRESSURE: 64 MMHG

## 2020-12-24 VITALS — DIASTOLIC BLOOD PRESSURE: 69 MMHG

## 2020-12-24 VITALS — DIASTOLIC BLOOD PRESSURE: 62 MMHG

## 2020-12-24 VITALS — DIASTOLIC BLOOD PRESSURE: 58 MMHG | SYSTOLIC BLOOD PRESSURE: 109 MMHG

## 2020-12-24 VITALS — DIASTOLIC BLOOD PRESSURE: 66 MMHG

## 2020-12-24 VITALS — DIASTOLIC BLOOD PRESSURE: 70 MMHG

## 2020-12-24 VITALS — DIASTOLIC BLOOD PRESSURE: 69 MMHG | SYSTOLIC BLOOD PRESSURE: 123 MMHG

## 2020-12-24 VITALS — DIASTOLIC BLOOD PRESSURE: 71 MMHG | SYSTOLIC BLOOD PRESSURE: 128 MMHG

## 2020-12-24 VITALS — DIASTOLIC BLOOD PRESSURE: 62 MMHG | SYSTOLIC BLOOD PRESSURE: 102 MMHG

## 2020-12-24 VITALS — SYSTOLIC BLOOD PRESSURE: 107 MMHG | DIASTOLIC BLOOD PRESSURE: 62 MMHG

## 2020-12-24 VITALS — DIASTOLIC BLOOD PRESSURE: 61 MMHG | SYSTOLIC BLOOD PRESSURE: 122 MMHG

## 2020-12-24 VITALS — DIASTOLIC BLOOD PRESSURE: 59 MMHG

## 2020-12-24 VITALS — DIASTOLIC BLOOD PRESSURE: 78 MMHG

## 2020-12-24 VITALS — DIASTOLIC BLOOD PRESSURE: 44 MMHG

## 2020-12-24 VITALS — DIASTOLIC BLOOD PRESSURE: 65 MMHG

## 2020-12-24 VITALS — DIASTOLIC BLOOD PRESSURE: 57 MMHG

## 2020-12-24 VITALS — DIASTOLIC BLOOD PRESSURE: 54 MMHG | SYSTOLIC BLOOD PRESSURE: 99 MMHG

## 2020-12-24 VITALS — SYSTOLIC BLOOD PRESSURE: 128 MMHG | DIASTOLIC BLOOD PRESSURE: 63 MMHG

## 2020-12-24 VITALS — DIASTOLIC BLOOD PRESSURE: 66 MMHG | SYSTOLIC BLOOD PRESSURE: 119 MMHG

## 2020-12-24 VITALS — DIASTOLIC BLOOD PRESSURE: 45 MMHG

## 2020-12-24 VITALS — DIASTOLIC BLOOD PRESSURE: 53 MMHG

## 2020-12-24 VITALS — SYSTOLIC BLOOD PRESSURE: 109 MMHG | DIASTOLIC BLOOD PRESSURE: 58 MMHG

## 2020-12-24 VITALS — SYSTOLIC BLOOD PRESSURE: 118 MMHG | DIASTOLIC BLOOD PRESSURE: 66 MMHG

## 2020-12-24 VITALS — DIASTOLIC BLOOD PRESSURE: 78 MMHG | SYSTOLIC BLOOD PRESSURE: 133 MMHG

## 2020-12-24 VITALS — SYSTOLIC BLOOD PRESSURE: 112 MMHG | DIASTOLIC BLOOD PRESSURE: 61 MMHG

## 2020-12-24 VITALS — DIASTOLIC BLOOD PRESSURE: 51 MMHG

## 2020-12-24 VITALS — SYSTOLIC BLOOD PRESSURE: 137 MMHG | DIASTOLIC BLOOD PRESSURE: 75 MMHG

## 2020-12-24 VITALS — DIASTOLIC BLOOD PRESSURE: 71 MMHG

## 2020-12-24 VITALS — SYSTOLIC BLOOD PRESSURE: 85 MMHG | DIASTOLIC BLOOD PRESSURE: 55 MMHG

## 2020-12-24 VITALS — SYSTOLIC BLOOD PRESSURE: 115 MMHG | DIASTOLIC BLOOD PRESSURE: 57 MMHG

## 2020-12-24 VITALS — DIASTOLIC BLOOD PRESSURE: 67 MMHG | SYSTOLIC BLOOD PRESSURE: 121 MMHG

## 2020-12-24 VITALS — DIASTOLIC BLOOD PRESSURE: 57 MMHG | SYSTOLIC BLOOD PRESSURE: 105 MMHG

## 2020-12-24 VITALS — DIASTOLIC BLOOD PRESSURE: 52 MMHG

## 2020-12-24 VITALS — SYSTOLIC BLOOD PRESSURE: 104 MMHG | DIASTOLIC BLOOD PRESSURE: 56 MMHG

## 2020-12-24 VITALS — DIASTOLIC BLOOD PRESSURE: 50 MMHG

## 2020-12-24 LAB
ALBUMIN SERPL-MCNC: 2.3 GM/DL (ref 3.1–4.5)
ALP SERPL-CCNC: 53 U/L (ref 45–117)
ALT SERPL W P-5'-P-CCNC: 15 U/L (ref 12–78)
AST SERPL-CCNC: 18 IU/L (ref 3–35)
BASE EXCESS BLDA CALC-SCNC: 5.5 MMOL/L (ref -2–2)
BUN SERPL-MCNC: 54 MG/DL (ref 7–24)
CHLORIDE SERPL-SCNC: 98 MMOL/L (ref 98–107)
CHLORIDE UR-SCNC: < 10 MMOL/L
CK SERPL-CCNC: 32 U/L (ref 39–308)
CREAT SERPL-MCNC: 2.39 MG/DL (ref 0.7–1.3)
CREAT UR-MCNC: 67.7 MG/DL
CRYSTALS URNS MICRO: (no result)
LDH SERPL-CCNC: 219 U/L (ref 87–241)
PCO2 BLDA: 57 MMHG (ref 35–45)
PH BLDA: 7.36 [PH] (ref 7.35–7.45)
PH UR STRIP: 5 [PH] (ref 4.5–8)
PO2 BLDA: 82 MMHG (ref 80–90)
POTASSIUM SERPL-SCNC: 3.7 MMOL/L (ref 3.5–5.1)
PROT SERPL-MCNC: 5.8 GM/DL (ref 6.4–8.2)
SAO2 % BLDA: 96 % (ref 95–97)
SODIUM SERPL-SCNC: 135 MMOL/L (ref 136–145)
SP GR UR: 1.01 (ref 1–1.03)
UROBILINOGEN UR STRIP-MCNC: 0.2 E.U./DL (ref 0–1)

## 2020-12-24 NOTE — NUR
SPOKE TO DR ERICKSON. ADVISED TO CONTINUE MONITORING PT. REPORT ANY EMERGENT
CONCERNS. HR 83 NSR AT THIS TIME. CARDIZEM STOPPED. PT CONITINUES TO BE
HYPOTENSIVE 85/55. DR. ERICKSON WILL BE IN TO SEE PT.

## 2020-12-24 NOTE — NUR
A 55, admitted to ICCU, under the
services of SYMONE Giron DO with a diagnosis of  COVID 19 / PNEUMONIA.
Chief complaint is  ACUTE RESP. FAILURE.
Patient arrived via bed from ER.
Monitor applied. Initial assessment completed.
Vital signs taken and recorded.
SYMONE GIRON DO notified of admission to the unit.
Orders received.
See assessment for past medical history, medications
and allergies.
Patient and/or family oriented to unit. [facility]
visitation policy reviewed.
Clothing/patient valuable form completed.
 
FERN TRAN

## 2020-12-24 NOTE — NUR
SPOKE TO DAUGHTER GAIL SAMUEL VIA PHONE. PT UPDATED AND REQUESTING FOR DOCTOR
TO CONTACT WITH AN UPDATE AS SOON AS POSSIBLE.

## 2020-12-24 NOTE — NUR
CONTINUING TO TITRATE IV LEVOPHED GTT FOR MAP OF 75.  PT SINUS CHANDRA 59 W/
FREQUENT PAC'S.  WILL CONTINUE TO MONITOR PT.

## 2020-12-25 VITALS — SYSTOLIC BLOOD PRESSURE: 103 MMHG | DIASTOLIC BLOOD PRESSURE: 60 MMHG

## 2020-12-25 VITALS — DIASTOLIC BLOOD PRESSURE: 54 MMHG

## 2020-12-25 VITALS — DIASTOLIC BLOOD PRESSURE: 71 MMHG | SYSTOLIC BLOOD PRESSURE: 135 MMHG

## 2020-12-25 VITALS — SYSTOLIC BLOOD PRESSURE: 96 MMHG | DIASTOLIC BLOOD PRESSURE: 54 MMHG

## 2020-12-25 VITALS — SYSTOLIC BLOOD PRESSURE: 108 MMHG | DIASTOLIC BLOOD PRESSURE: 56 MMHG

## 2020-12-25 VITALS — DIASTOLIC BLOOD PRESSURE: 60 MMHG

## 2020-12-25 VITALS — SYSTOLIC BLOOD PRESSURE: 118 MMHG | DIASTOLIC BLOOD PRESSURE: 59 MMHG

## 2020-12-25 VITALS — DIASTOLIC BLOOD PRESSURE: 63 MMHG

## 2020-12-25 VITALS — DIASTOLIC BLOOD PRESSURE: 64 MMHG

## 2020-12-25 VITALS — DIASTOLIC BLOOD PRESSURE: 65 MMHG

## 2020-12-25 VITALS — DIASTOLIC BLOOD PRESSURE: 66 MMHG | SYSTOLIC BLOOD PRESSURE: 126 MMHG

## 2020-12-25 VITALS — DIASTOLIC BLOOD PRESSURE: 69 MMHG | SYSTOLIC BLOOD PRESSURE: 130 MMHG

## 2020-12-25 VITALS — SYSTOLIC BLOOD PRESSURE: 108 MMHG | DIASTOLIC BLOOD PRESSURE: 62 MMHG

## 2020-12-25 VITALS — SYSTOLIC BLOOD PRESSURE: 97 MMHG | DIASTOLIC BLOOD PRESSURE: 60 MMHG

## 2020-12-25 VITALS — DIASTOLIC BLOOD PRESSURE: 54 MMHG | SYSTOLIC BLOOD PRESSURE: 109 MMHG

## 2020-12-25 VITALS — SYSTOLIC BLOOD PRESSURE: 111 MMHG | DIASTOLIC BLOOD PRESSURE: 55 MMHG

## 2020-12-25 VITALS — SYSTOLIC BLOOD PRESSURE: 126 MMHG | DIASTOLIC BLOOD PRESSURE: 71 MMHG

## 2020-12-25 VITALS — DIASTOLIC BLOOD PRESSURE: 55 MMHG | SYSTOLIC BLOOD PRESSURE: 98 MMHG

## 2020-12-25 VITALS — DIASTOLIC BLOOD PRESSURE: 66 MMHG | SYSTOLIC BLOOD PRESSURE: 101 MMHG

## 2020-12-25 VITALS — DIASTOLIC BLOOD PRESSURE: 66 MMHG | SYSTOLIC BLOOD PRESSURE: 108 MMHG

## 2020-12-25 VITALS — DIASTOLIC BLOOD PRESSURE: 65 MMHG | SYSTOLIC BLOOD PRESSURE: 112 MMHG

## 2020-12-25 VITALS — SYSTOLIC BLOOD PRESSURE: 109 MMHG | DIASTOLIC BLOOD PRESSURE: 64 MMHG

## 2020-12-25 VITALS — DIASTOLIC BLOOD PRESSURE: 56 MMHG | SYSTOLIC BLOOD PRESSURE: 105 MMHG

## 2020-12-25 VITALS — DIASTOLIC BLOOD PRESSURE: 56 MMHG | SYSTOLIC BLOOD PRESSURE: 116 MMHG

## 2020-12-25 VITALS — DIASTOLIC BLOOD PRESSURE: 58 MMHG

## 2020-12-25 VITALS — DIASTOLIC BLOOD PRESSURE: 56 MMHG | SYSTOLIC BLOOD PRESSURE: 98 MMHG

## 2020-12-25 VITALS — DIASTOLIC BLOOD PRESSURE: 67 MMHG

## 2020-12-25 VITALS — DIASTOLIC BLOOD PRESSURE: 81 MMHG

## 2020-12-25 VITALS — DIASTOLIC BLOOD PRESSURE: 60 MMHG | SYSTOLIC BLOOD PRESSURE: 130 MMHG

## 2020-12-25 VITALS — SYSTOLIC BLOOD PRESSURE: 90 MMHG | DIASTOLIC BLOOD PRESSURE: 58 MMHG

## 2020-12-25 VITALS — DIASTOLIC BLOOD PRESSURE: 56 MMHG

## 2020-12-25 VITALS — DIASTOLIC BLOOD PRESSURE: 55 MMHG

## 2020-12-25 VITALS — DIASTOLIC BLOOD PRESSURE: 57 MMHG

## 2020-12-25 VITALS — DIASTOLIC BLOOD PRESSURE: 55 MMHG | SYSTOLIC BLOOD PRESSURE: 90 MMHG

## 2020-12-25 VITALS — DIASTOLIC BLOOD PRESSURE: 57 MMHG | SYSTOLIC BLOOD PRESSURE: 101 MMHG

## 2020-12-25 VITALS — DIASTOLIC BLOOD PRESSURE: 60 MMHG | SYSTOLIC BLOOD PRESSURE: 104 MMHG

## 2020-12-25 VITALS — DIASTOLIC BLOOD PRESSURE: 61 MMHG

## 2020-12-25 VITALS — SYSTOLIC BLOOD PRESSURE: 100 MMHG | DIASTOLIC BLOOD PRESSURE: 67 MMHG

## 2020-12-25 VITALS — SYSTOLIC BLOOD PRESSURE: 104 MMHG | DIASTOLIC BLOOD PRESSURE: 65 MMHG

## 2020-12-25 VITALS — SYSTOLIC BLOOD PRESSURE: 93 MMHG | DIASTOLIC BLOOD PRESSURE: 60 MMHG

## 2020-12-25 VITALS — DIASTOLIC BLOOD PRESSURE: 71 MMHG | SYSTOLIC BLOOD PRESSURE: 145 MMHG

## 2020-12-25 VITALS — SYSTOLIC BLOOD PRESSURE: 102 MMHG | DIASTOLIC BLOOD PRESSURE: 61 MMHG

## 2020-12-25 VITALS — DIASTOLIC BLOOD PRESSURE: 63 MMHG | SYSTOLIC BLOOD PRESSURE: 98 MMHG

## 2020-12-25 VITALS — DIASTOLIC BLOOD PRESSURE: 59 MMHG

## 2020-12-25 VITALS — SYSTOLIC BLOOD PRESSURE: 99 MMHG | DIASTOLIC BLOOD PRESSURE: 55 MMHG

## 2020-12-25 VITALS — DIASTOLIC BLOOD PRESSURE: 78 MMHG

## 2020-12-25 VITALS — DIASTOLIC BLOOD PRESSURE: 60 MMHG | SYSTOLIC BLOOD PRESSURE: 107 MMHG

## 2020-12-25 VITALS — SYSTOLIC BLOOD PRESSURE: 107 MMHG | DIASTOLIC BLOOD PRESSURE: 54 MMHG

## 2020-12-25 VITALS — SYSTOLIC BLOOD PRESSURE: 95 MMHG | DIASTOLIC BLOOD PRESSURE: 53 MMHG

## 2020-12-25 VITALS — DIASTOLIC BLOOD PRESSURE: 54 MMHG | SYSTOLIC BLOOD PRESSURE: 96 MMHG

## 2020-12-25 VITALS — DIASTOLIC BLOOD PRESSURE: 55 MMHG | SYSTOLIC BLOOD PRESSURE: 104 MMHG

## 2020-12-25 VITALS — DIASTOLIC BLOOD PRESSURE: 57 MMHG | SYSTOLIC BLOOD PRESSURE: 86 MMHG

## 2020-12-25 VITALS — DIASTOLIC BLOOD PRESSURE: 80 MMHG

## 2020-12-25 VITALS — SYSTOLIC BLOOD PRESSURE: 138 MMHG | DIASTOLIC BLOOD PRESSURE: 70 MMHG

## 2020-12-25 VITALS — DIASTOLIC BLOOD PRESSURE: 55 MMHG | SYSTOLIC BLOOD PRESSURE: 97 MMHG

## 2020-12-25 VITALS — DIASTOLIC BLOOD PRESSURE: 62 MMHG | SYSTOLIC BLOOD PRESSURE: 97 MMHG

## 2020-12-25 VITALS — SYSTOLIC BLOOD PRESSURE: 116 MMHG | DIASTOLIC BLOOD PRESSURE: 65 MMHG

## 2020-12-25 VITALS — DIASTOLIC BLOOD PRESSURE: 63 MMHG | SYSTOLIC BLOOD PRESSURE: 112 MMHG

## 2020-12-25 VITALS — DIASTOLIC BLOOD PRESSURE: 59 MMHG | SYSTOLIC BLOOD PRESSURE: 97 MMHG

## 2020-12-25 VITALS — DIASTOLIC BLOOD PRESSURE: 63 MMHG | SYSTOLIC BLOOD PRESSURE: 102 MMHG

## 2020-12-25 VITALS — DIASTOLIC BLOOD PRESSURE: 62 MMHG

## 2020-12-25 VITALS — SYSTOLIC BLOOD PRESSURE: 99 MMHG | DIASTOLIC BLOOD PRESSURE: 60 MMHG

## 2020-12-25 VITALS — DIASTOLIC BLOOD PRESSURE: 56 MMHG | SYSTOLIC BLOOD PRESSURE: 104 MMHG

## 2020-12-25 VITALS — SYSTOLIC BLOOD PRESSURE: 101 MMHG | DIASTOLIC BLOOD PRESSURE: 62 MMHG

## 2020-12-25 VITALS — DIASTOLIC BLOOD PRESSURE: 69 MMHG

## 2020-12-25 VITALS — DIASTOLIC BLOOD PRESSURE: 70 MMHG

## 2020-12-25 VITALS — DIASTOLIC BLOOD PRESSURE: 66 MMHG

## 2020-12-25 VITALS — DIASTOLIC BLOOD PRESSURE: 68 MMHG

## 2020-12-25 VITALS — DIASTOLIC BLOOD PRESSURE: 60 MMHG | SYSTOLIC BLOOD PRESSURE: 100 MMHG

## 2020-12-25 VITALS — DIASTOLIC BLOOD PRESSURE: 51 MMHG

## 2020-12-25 VITALS — DIASTOLIC BLOOD PRESSURE: 71 MMHG

## 2020-12-25 LAB
ALBUMIN SERPL-MCNC: 2.1 GM/DL (ref 3.1–4.5)
ALP SERPL-CCNC: 49 U/L (ref 45–117)
ALT SERPL W P-5'-P-CCNC: 16 U/L (ref 12–78)
AST SERPL-CCNC: 23 IU/L (ref 3–35)
BASE EXCESS BLDA CALC-SCNC: 1.1 MMOL/L (ref -2–2)
BASE EXCESS BLDA CALC-SCNC: 1.4 MMOL/L (ref -2–2)
BASE EXCESS BLDA CALC-SCNC: 1.8 MMOL/L (ref -2–2)
BUN SERPL-MCNC: 46 MG/DL (ref 7–24)
CHLORIDE SERPL-SCNC: 100 MMOL/L (ref 98–107)
CK SERPL-CCNC: 40 U/L (ref 39–308)
CREAT SERPL-MCNC: 1.93 MG/DL (ref 0.7–1.3)
ERYTHROCYTE [DISTWIDTH] IN BLOOD BY AUTOMATED COUNT: 14.6 % (ref 0–14.5)
HCT VFR BLD AUTO: 26.4 % (ref 42–52)
LDH SERPL-CCNC: 253 U/L (ref 87–241)
MCH RBC QN AUTO: 28.3 PG (ref 27–31)
MCHC RBC AUTO-ENTMCNC: 31.1 G/DL (ref 33–37)
MCV RBC AUTO: 91 FL (ref 80–94)
NRBC BLD QL AUTO: 0 10*3/UL (ref 0–0)
PCO2 BLDA: 50 MMHG (ref 35–45)
PCO2 BLDA: 59 MMHG (ref 35–45)
PCO2 BLDA: 59 MMHG (ref 35–45)
PH BLDA: 7.29 [PH] (ref 7.35–7.45)
PH BLDA: 7.3 [PH] (ref 7.35–7.45)
PH BLDA: 7.36 [PH] (ref 7.35–7.45)
PLATELET # BLD AUTO: 144 10*3/UL (ref 130–400)
PLATELET SUFFICIENCY: NORMAL
PMV BLD AUTO: 12.2 FL (ref 9.6–12.3)
PO2 BLDA: 126 MMHG (ref 80–90)
PO2 BLDA: 73 MMHG (ref 80–90)
PO2 BLDA: 77 MMHG (ref 80–90)
POTASSIUM SERPL-SCNC: 4.2 MMOL/L (ref 3.5–5.1)
PROT SERPL-MCNC: 6 GM/DL (ref 6.4–8.2)
RBC # BLD AUTO: 2.9 10*6/UL (ref 4.5–5.9)
SAO2 % BLDA: 92 % (ref 95–97)
SAO2 % BLDA: 93 % (ref 95–97)
SAO2 % BLDA: 99 % (ref 95–97)
SODIUM SERPL-SCNC: 134 MMOL/L (ref 136–145)
TOTAL CELLS COUNTED: 100 #CELLS
TRIGL SERPL-MCNC: 122 MG/DL (ref ?–150)
WBC NRBC COR # BLD AUTO: 9.7 10*3/UL (ref 4.8–10.8)

## 2020-12-25 NOTE — NUR
Patient tolerating vent, does occasionally open eyes. Patients levophed
titrated down to 4mcg. All vital signs stable at this time.

## 2020-12-25 NOTE — NUR
SEDATION VACATION FROM DIPROVAN FOR APPROX 10+ MINUTES. OPENS EYES BUT DOESN'T
FOLLOW COMMAND TO SQUEEZE HANDS. BILAT HAND EDEMA  NOTED. BEAR HUGGAR OFF DUE
TO RECTAL TEMP 99.1.
REPOSITIONED Q2 HOURS PER ORDERS.
TOLERATING TF AT THIS TIME.
TITRATING LEVOPHED AND DIPROVAN AS CLIENT TOLERATES THEM

## 2020-12-26 VITALS — DIASTOLIC BLOOD PRESSURE: 62 MMHG

## 2020-12-26 VITALS — DIASTOLIC BLOOD PRESSURE: 59 MMHG | SYSTOLIC BLOOD PRESSURE: 95 MMHG

## 2020-12-26 VITALS — DIASTOLIC BLOOD PRESSURE: 75 MMHG

## 2020-12-26 VITALS — DIASTOLIC BLOOD PRESSURE: 58 MMHG

## 2020-12-26 VITALS — DIASTOLIC BLOOD PRESSURE: 63 MMHG

## 2020-12-26 VITALS — DIASTOLIC BLOOD PRESSURE: 57 MMHG

## 2020-12-26 VITALS — DIASTOLIC BLOOD PRESSURE: 59 MMHG | SYSTOLIC BLOOD PRESSURE: 92 MMHG

## 2020-12-26 VITALS — SYSTOLIC BLOOD PRESSURE: 108 MMHG | DIASTOLIC BLOOD PRESSURE: 63 MMHG

## 2020-12-26 VITALS — DIASTOLIC BLOOD PRESSURE: 58 MMHG | SYSTOLIC BLOOD PRESSURE: 94 MMHG

## 2020-12-26 VITALS — DIASTOLIC BLOOD PRESSURE: 68 MMHG | SYSTOLIC BLOOD PRESSURE: 122 MMHG

## 2020-12-26 VITALS — DIASTOLIC BLOOD PRESSURE: 65 MMHG | SYSTOLIC BLOOD PRESSURE: 160 MMHG

## 2020-12-26 VITALS — SYSTOLIC BLOOD PRESSURE: 98 MMHG | DIASTOLIC BLOOD PRESSURE: 58 MMHG

## 2020-12-26 VITALS — SYSTOLIC BLOOD PRESSURE: 94 MMHG | DIASTOLIC BLOOD PRESSURE: 59 MMHG

## 2020-12-26 VITALS — DIASTOLIC BLOOD PRESSURE: 57 MMHG | SYSTOLIC BLOOD PRESSURE: 94 MMHG

## 2020-12-26 VITALS — SYSTOLIC BLOOD PRESSURE: 132 MMHG | DIASTOLIC BLOOD PRESSURE: 68 MMHG

## 2020-12-26 VITALS — SYSTOLIC BLOOD PRESSURE: 126 MMHG | DIASTOLIC BLOOD PRESSURE: 67 MMHG

## 2020-12-26 VITALS — SYSTOLIC BLOOD PRESSURE: 110 MMHG | DIASTOLIC BLOOD PRESSURE: 71 MMHG

## 2020-12-26 VITALS — SYSTOLIC BLOOD PRESSURE: 96 MMHG | DIASTOLIC BLOOD PRESSURE: 58 MMHG

## 2020-12-26 VITALS — DIASTOLIC BLOOD PRESSURE: 56 MMHG

## 2020-12-26 VITALS — DIASTOLIC BLOOD PRESSURE: 59 MMHG

## 2020-12-26 VITALS — DIASTOLIC BLOOD PRESSURE: 67 MMHG

## 2020-12-26 VITALS — DIASTOLIC BLOOD PRESSURE: 57 MMHG | SYSTOLIC BLOOD PRESSURE: 90 MMHG

## 2020-12-26 VITALS — SYSTOLIC BLOOD PRESSURE: 133 MMHG | DIASTOLIC BLOOD PRESSURE: 67 MMHG

## 2020-12-26 VITALS — DIASTOLIC BLOOD PRESSURE: 72 MMHG

## 2020-12-26 VITALS — SYSTOLIC BLOOD PRESSURE: 98 MMHG | DIASTOLIC BLOOD PRESSURE: 61 MMHG

## 2020-12-26 VITALS — DIASTOLIC BLOOD PRESSURE: 62 MMHG | SYSTOLIC BLOOD PRESSURE: 99 MMHG

## 2020-12-26 VITALS — SYSTOLIC BLOOD PRESSURE: 100 MMHG | DIASTOLIC BLOOD PRESSURE: 60 MMHG

## 2020-12-26 VITALS — DIASTOLIC BLOOD PRESSURE: 75 MMHG | SYSTOLIC BLOOD PRESSURE: 148 MMHG

## 2020-12-26 VITALS — SYSTOLIC BLOOD PRESSURE: 166 MMHG | DIASTOLIC BLOOD PRESSURE: 70 MMHG

## 2020-12-26 VITALS — DIASTOLIC BLOOD PRESSURE: 68 MMHG

## 2020-12-26 VITALS — DIASTOLIC BLOOD PRESSURE: 71 MMHG

## 2020-12-26 VITALS — DIASTOLIC BLOOD PRESSURE: 65 MMHG | SYSTOLIC BLOOD PRESSURE: 113 MMHG

## 2020-12-26 VITALS — SYSTOLIC BLOOD PRESSURE: 142 MMHG | DIASTOLIC BLOOD PRESSURE: 7 MMHG

## 2020-12-26 VITALS — DIASTOLIC BLOOD PRESSURE: 74 MMHG

## 2020-12-26 VITALS — DIASTOLIC BLOOD PRESSURE: 65 MMHG

## 2020-12-26 VITALS — DIASTOLIC BLOOD PRESSURE: 64 MMHG

## 2020-12-26 VITALS — DIASTOLIC BLOOD PRESSURE: 70 MMHG | SYSTOLIC BLOOD PRESSURE: 134 MMHG

## 2020-12-26 LAB
ALBUMIN SERPL-MCNC: 2 GM/DL (ref 3.1–4.5)
ALP SERPL-CCNC: 42 U/L (ref 45–117)
ALT SERPL W P-5'-P-CCNC: 16 U/L (ref 12–78)
AST SERPL-CCNC: 25 IU/L (ref 3–35)
BASE EXCESS BLDA CALC-SCNC: 1.7 MMOL/L (ref -2–2)
BASE EXCESS BLDA CALC-SCNC: 3.1 MMOL/L (ref -2–2)
BASE EXCESS BLDA CALC-SCNC: 3.1 MMOL/L (ref -2–2)
BUN SERPL-MCNC: 49 MG/DL (ref 7–24)
CHLORIDE SERPL-SCNC: 101 MMOL/L (ref 98–107)
CK SERPL-CCNC: 181 U/L (ref 39–308)
CREAT SERPL-MCNC: 1.8 MG/DL (ref 0.7–1.3)
ERYTHROCYTE [DISTWIDTH] IN BLOOD BY AUTOMATED COUNT: 14.6 % (ref 0–14.5)
HCT VFR BLD AUTO: 25.9 % (ref 42–52)
LDH SERPL-CCNC: 279 U/L (ref 87–241)
MCH RBC QN AUTO: 27.4 PG (ref 27–31)
MCHC RBC AUTO-ENTMCNC: 30.5 G/DL (ref 33–37)
MCV RBC AUTO: 89.9 FL (ref 80–94)
NRBC BLD QL AUTO: 0 % (ref 0–0)
PCO2 BLDA: 53 MMHG (ref 35–45)
PCO2 BLDA: 70 MMHG (ref 35–45)
PCO2 BLDA: 88 MMHG (ref 35–45)
PH BLDA: 7.18 [PH] (ref 7.35–7.45)
PH BLDA: 7.26 [PH] (ref 7.35–7.45)
PH BLDA: 7.35 [PH] (ref 7.35–7.45)
PLATELET # BLD AUTO: 161 10*3/UL (ref 130–400)
PLATELET SUFFICIENCY: NORMAL
PMV BLD AUTO: 12.7 FL (ref 9.6–12.3)
PO2 BLDA: 106 MMHG (ref 80–90)
PO2 BLDA: 73 MMHG (ref 80–90)
PO2 BLDA: 75 MMHG (ref 80–90)
POTASSIUM SERPL-SCNC: 4.6 MMOL/L (ref 3.5–5.1)
PROT SERPL-MCNC: 6 GM/DL (ref 6.4–8.2)
RBC # BLD AUTO: 2.88 10*6/UL (ref 4.5–5.9)
SAO2 % BLDA: 90 % (ref 95–97)
SAO2 % BLDA: 93 % (ref 95–97)
SAO2 % BLDA: 97 % (ref 95–97)
SODIUM SERPL-SCNC: 135 MMOL/L (ref 136–145)
TOTAL CELLS COUNTED: 100 #CELLS
WBC NRBC COR # BLD AUTO: 16 10*3/UL (ref 4.8–10.8)

## 2020-12-26 PROCEDURE — XW033E5 INTRODUCTION OF REMDESIVIR ANTI-INFECTIVE INTO PERIPHERAL VEIN, PERCUTANEOUS APPROACH, NEW TECHNOLOGY GROUP 5: ICD-10-PCS | Performed by: INTERNAL MEDICINE

## 2020-12-26 NOTE — NUR
PT HAD VERY SMALL SOFT MUSHY STOOL ON BEDPAN.  PM CARE DONE. PT REMAINS AWAKE,
ALERT AND COOPERATIVE.

## 2020-12-26 NOTE — NUR
SEDATION VACATION-PT AROUSES IN 15 MINS, OPEN EYES, LOOKS TOWARD STAFF AND
NODS HEAD IN RESPONSE TO QUESTIONS, FOLLOWS SIMPLE COMMANDS

## 2020-12-26 NOTE — NUR
SEDATION TURNED OFF
EXPLANATIONS GIVEN
UNABLE TO PLACE NGT ,IT MEETS WITH AN OBSTRUCTION IN BOTH NARES, DR MOCTEZUMA AWARE
OK TO LEAVE OGT IN IF PT IS ABLE TO BE EXTUBATED

## 2020-12-27 VITALS — DIASTOLIC BLOOD PRESSURE: 72 MMHG

## 2020-12-27 VITALS — DIASTOLIC BLOOD PRESSURE: 67 MMHG | SYSTOLIC BLOOD PRESSURE: 97 MMHG

## 2020-12-27 VITALS — DIASTOLIC BLOOD PRESSURE: 68 MMHG

## 2020-12-27 VITALS — DIASTOLIC BLOOD PRESSURE: 59 MMHG

## 2020-12-27 VITALS — DIASTOLIC BLOOD PRESSURE: 78 MMHG | SYSTOLIC BLOOD PRESSURE: 107 MMHG

## 2020-12-27 VITALS — DIASTOLIC BLOOD PRESSURE: 76 MMHG

## 2020-12-27 LAB
ALBUMIN SERPL-MCNC: 2.1 GM/DL (ref 3.1–4.5)
ALP SERPL-CCNC: 55 U/L (ref 45–117)
ALT SERPL W P-5'-P-CCNC: 22 U/L (ref 12–78)
APTT PPP: 22.1 SECONDS (ref 20–32.1)
AST SERPL-CCNC: 33 IU/L (ref 3–35)
BASE EXCESS BLDA CALC-SCNC: 3.9 MMOL/L (ref -2–2)
BASE EXCESS BLDA CALC-SCNC: 4.5 MMOL/L (ref -2–2)
BASE EXCESS BLDA CALC-SCNC: 4.8 MMOL/L (ref -2–2)
BUN SERPL-MCNC: 58 MG/DL (ref 7–24)
CHLORIDE SERPL-SCNC: 105 MMOL/L (ref 98–107)
CK SERPL-CCNC: 146 U/L (ref 39–308)
CREAT SERPL-MCNC: 1.62 MG/DL (ref 0.7–1.3)
ERYTHROCYTE [DISTWIDTH] IN BLOOD BY AUTOMATED COUNT: 14.8 % (ref 0–14.5)
HCT VFR BLD AUTO: 30.6 % (ref 42–52)
INR BLD: 0.9 (ref 2–3.5)
LDH SERPL-CCNC: 398 U/L (ref 87–241)
MCH RBC QN AUTO: 27.8 PG (ref 27–31)
MCHC RBC AUTO-ENTMCNC: 30.1 G/DL (ref 33–37)
MCV RBC AUTO: 92.4 FL (ref 80–94)
NRBC BLD QL AUTO: 0 10*3/UL (ref 0–0)
PCO2 BLDA: 53 MMHG (ref 35–45)
PCO2 BLDA: 65 MMHG (ref 35–45)
PCO2 BLDA: 68 MMHG (ref 35–45)
PH BLDA: 7.3 [PH] (ref 7.35–7.45)
PH BLDA: 7.3 [PH] (ref 7.35–7.45)
PH BLDA: 7.37 [PH] (ref 7.35–7.45)
PLATELET # BLD AUTO: 214 10*3/UL (ref 130–400)
PLATELET SUFFICIENCY: NORMAL
PMV BLD AUTO: 12.4 FL (ref 9.6–12.3)
PO2 BLDA: 128 MMHG (ref 80–90)
PO2 BLDA: 145 MMHG (ref 80–90)
PO2 BLDA: 71 MMHG (ref 80–90)
POTASSIUM SERPL-SCNC: 4.7 MMOL/L (ref 3.5–5.1)
PROT SERPL-MCNC: 6.6 GM/DL (ref 6.4–8.2)
RBC # BLD AUTO: 3.31 10*6/UL (ref 4.5–5.9)
SAO2 % BLDA: 94 % (ref 95–97)
SAO2 % BLDA: 98 % (ref 95–97)
SAO2 % BLDA: 98 % (ref 95–97)
SODIUM SERPL-SCNC: 141 MMOL/L (ref 136–145)
TOTAL CELLS COUNTED: 100 #CELLS
WBC NRBC COR # BLD AUTO: 12.7 10*3/UL (ref 4.8–10.8)

## 2020-12-27 PROCEDURE — 5A09357 ASSISTANCE WITH RESPIRATORY VENTILATION, LESS THAN 24 CONSECUTIVE HOURS, CONTINUOUS POSITIVE AIRWAY PRESSURE: ICD-10-PCS | Performed by: INTERNAL MEDICINE

## 2020-12-27 NOTE — NUR
EXTUBATED. PLACED ON BIPAP 26/10, FIO2 40% AND TITRATED UP TO 60% PER
RESPIRATORY. PT TOLEARTED WELL. OGT LEFT IN PLACE AND TAPED TO CHEEK AT 55CM
YEMI. RESTRAINTS REMOVED.

## 2020-12-27 NOTE — NUR
PT'S VENT  KICKED OVER TO AC MODE WHILE HE SLEEPS. RESP DEPT HERE AND AWARE.
PT CONTINUES TO AWAKE EASILY AND REMAINS APPROPRIATE AND COOPERATIVE.

## 2020-12-27 NOTE — NUR
ON ASSESSMENT THIS AM PATIENT ALERT, FOLLOWING COMMANDS. HE REMAINS ORALLY
INTUBATED, A/C MODE. SOFT RESTRAINTS IN PLACE TO MAINTAIN INTUBATION. RIJ MLC
INTACT, HEP LOCKS RAN/JOAQUIN. RR ARTERIAL LINE INTACT. BARRETT PATENT KITTY URINE.
REPOSITIONED FOR COMFORT. ORAL CARE DONE. SEE ALL APPROPRIATE INTERVENTIONS.

## 2020-12-27 NOTE — NUR
DR MOCTEZUMA HAS VISITED. PT HAS BEEN ON CPAP/PS SINCE AROUND 2PM. HIS VENTILATOR
SWITCHED TO APNEA VENTILATION X 1. RESPIRATORY HAS ADJUSTED APNEA TIME AND PT
HAS REMAINED ON CPAP, COMFORTABLE WITHOUT SEDATION. ART LINE WAVEFORM IS DAMP.
AUTO BP CUFF HAS BP 90/59. PULMOCARE CONTINUES AT 30ML/HR. SEE ALL APPROPRIATE
INTERVENTIONS.

## 2020-12-28 VITALS — SYSTOLIC BLOOD PRESSURE: 130 MMHG | DIASTOLIC BLOOD PRESSURE: 71 MMHG

## 2020-12-28 VITALS — DIASTOLIC BLOOD PRESSURE: 60 MMHG

## 2020-12-28 VITALS — DIASTOLIC BLOOD PRESSURE: 64 MMHG | SYSTOLIC BLOOD PRESSURE: 111 MMHG

## 2020-12-28 VITALS — DIASTOLIC BLOOD PRESSURE: 62 MMHG

## 2020-12-28 VITALS — DIASTOLIC BLOOD PRESSURE: 92 MMHG

## 2020-12-28 VITALS — DIASTOLIC BLOOD PRESSURE: 72 MMHG

## 2020-12-28 LAB
ALBUMIN SERPL-MCNC: 2 GM/DL (ref 3.1–4.5)
ALP SERPL-CCNC: 66 U/L (ref 45–117)
ALT SERPL W P-5'-P-CCNC: 54 U/L (ref 12–78)
APTT PPP: 53.2 SECONDS (ref 20–32.1)
AST SERPL-CCNC: 53 IU/L (ref 3–35)
BASE EXCESS BLDA CALC-SCNC: 5 MMOL/L (ref -2–2)
BUN SERPL-MCNC: 72 MG/DL (ref 7–24)
CHLORIDE SERPL-SCNC: 109 MMOL/L (ref 98–107)
CK SERPL-CCNC: 76 U/L (ref 39–308)
CREAT SERPL-MCNC: 1.58 MG/DL (ref 0.7–1.3)
ERYTHROCYTE [DISTWIDTH] IN BLOOD BY AUTOMATED COUNT: 14.9 % (ref 0–14.5)
HCT VFR BLD AUTO: 30.5 % (ref 42–52)
LDH SERPL-CCNC: 391 U/L (ref 87–241)
MCH RBC QN AUTO: 27.1 PG (ref 27–31)
MCHC RBC AUTO-ENTMCNC: 29.5 G/DL (ref 33–37)
MCV RBC AUTO: 91.9 FL (ref 80–94)
NRBC BLD QL AUTO: 0.2 % (ref 0–0)
PCO2 BLDA: 69 MMHG (ref 35–45)
PH BLDA: 7.29 [PH] (ref 7.35–7.45)
PLATELET # BLD AUTO: 295 10*3/UL (ref 130–400)
PLATELET SUFFICIENCY: NORMAL
PMV BLD AUTO: 12.1 FL (ref 9.6–12.3)
PO2 BLDA: 87 MMHG (ref 80–90)
POLYCHROMASIA BLD QL SMEAR: SLIGHT
POTASSIUM SERPL-SCNC: 4.4 MMOL/L (ref 3.5–5.1)
PROT SERPL-MCNC: 6.2 GM/DL (ref 6.4–8.2)
RBC # BLD AUTO: 3.32 10*6/UL (ref 4.5–5.9)
SAO2 % BLDA: 96 % (ref 95–97)
SCHISTOCYTES BLD QL SMEAR: (no result)
SODIUM SERPL-SCNC: 148 MMOL/L (ref 136–145)
STOMATOCYTES BLD QL SMEAR: (no result)
TARGETS BLD QL SMEAR: (no result)
TOTAL CELLS COUNTED: 100 #CELLS
VARIANT LYMPHS NFR BLD MANUAL: 3 % (ref 0–0)
WBC NRBC COR # BLD AUTO: 9.8 10*3/UL (ref 4.8–10.8)

## 2020-12-28 PROCEDURE — 5A09357 ASSISTANCE WITH RESPIRATORY VENTILATION, LESS THAN 24 CONSECUTIVE HOURS, CONTINUOUS POSITIVE AIRWAY PRESSURE: ICD-10-PCS | Performed by: INTERNAL MEDICINE

## 2020-12-28 NOTE — NUR
AWAKE, ALERT, DISORIENTED TO TIME AND PLACE BUT FOLLOWS COMMANDS
MOVES ALL EXTREMETIES EQUALLY, VERY WAK, ARM MOVEMENT ENCOURGED, DRSG TO RIJ
CHANGED

## 2020-12-28 NOTE — NUR
PULSE OX 80% ON BIPAP. GOOD PLETH ON PULSE OX. STRIP RECORDED & PLACED ON
CHART.
PT IN NO DISTRESS. RESP DEPT AND DR BENAVIDES NOTIFIED.
WHEN PT WAS AWAKENED, HIS PULSE OX WENT BACK UP INTO THE LOW 90'S.

## 2020-12-28 NOTE — NUR
DR MOCTEZUMA NOTIFIED OF EARLIER EVENT AND THAT PT REMAINS TELE STATUS.  PT REMAINS
WITHOUT DISTRESS OR HYPOXIA AT THIS TIME.  WILL CONTINUE TO MONITOR.

## 2020-12-28 NOTE — NUR
Patient tolerating bipap well. Does request drinks very often. Mouth is being
swabbed. Patient has no complaints at this time. Will continue to monitor.

## 2020-12-29 VITALS — SYSTOLIC BLOOD PRESSURE: 121 MMHG | DIASTOLIC BLOOD PRESSURE: 84 MMHG

## 2020-12-29 VITALS — DIASTOLIC BLOOD PRESSURE: 81 MMHG

## 2020-12-29 VITALS — DIASTOLIC BLOOD PRESSURE: 40 MMHG

## 2020-12-29 VITALS — SYSTOLIC BLOOD PRESSURE: 132 MMHG | DIASTOLIC BLOOD PRESSURE: 68 MMHG

## 2020-12-29 VITALS — DIASTOLIC BLOOD PRESSURE: 47 MMHG | SYSTOLIC BLOOD PRESSURE: 81 MMHG

## 2020-12-29 VITALS — SYSTOLIC BLOOD PRESSURE: 154 MMHG | DIASTOLIC BLOOD PRESSURE: 74 MMHG

## 2020-12-29 VITALS — DIASTOLIC BLOOD PRESSURE: 66 MMHG | SYSTOLIC BLOOD PRESSURE: 130 MMHG

## 2020-12-29 VITALS — DIASTOLIC BLOOD PRESSURE: 52 MMHG | SYSTOLIC BLOOD PRESSURE: 86 MMHG

## 2020-12-29 VITALS — DIASTOLIC BLOOD PRESSURE: 39 MMHG

## 2020-12-29 VITALS — DIASTOLIC BLOOD PRESSURE: 64 MMHG | SYSTOLIC BLOOD PRESSURE: 108 MMHG

## 2020-12-29 VITALS — DIASTOLIC BLOOD PRESSURE: 66 MMHG

## 2020-12-29 VITALS — SYSTOLIC BLOOD PRESSURE: 126 MMHG | DIASTOLIC BLOOD PRESSURE: 76 MMHG

## 2020-12-29 LAB
ALBUMIN SERPL-MCNC: 2.2 GM/DL (ref 3.1–4.5)
ALP SERPL-CCNC: 59 U/L (ref 45–117)
ALT SERPL W P-5'-P-CCNC: 44 U/L (ref 12–78)
AST SERPL-CCNC: 35 IU/L (ref 3–35)
BASE EXCESS BLDA CALC-SCNC: 10.8 MMOL/L (ref -2–2)
BUN SERPL-MCNC: 55 MG/DL (ref 7–24)
BUN SERPL-MCNC: 56 MG/DL (ref 7–24)
CHLORIDE SERPL-SCNC: 107 MMOL/L (ref 98–107)
CHLORIDE SERPL-SCNC: 107 MMOL/L (ref 98–107)
CREAT SERPL-MCNC: 1.22 MG/DL (ref 0.7–1.3)
CREAT SERPL-MCNC: 1.28 MG/DL (ref 0.7–1.3)
ERYTHROCYTE [DISTWIDTH] IN BLOOD BY AUTOMATED COUNT: 15 % (ref 0–14.5)
HCT VFR BLD AUTO: 31.6 % (ref 42–52)
LDH SERPL-CCNC: 377 U/L (ref 87–241)
MACROCYTES BLD QL SMEAR: SLIGHT
MCH RBC QN AUTO: 27 PG (ref 27–31)
MCHC RBC AUTO-ENTMCNC: 28.5 G/DL (ref 33–37)
MCV RBC AUTO: 94.9 FL (ref 80–94)
NRBC BLD QL AUTO: 0 % (ref 0–0)
OVALOCYTES BLD QL SMEAR: (no result)
PCO2 BLDA: 113 MMHG (ref 35–45)
PH BLDA: 7.19 [PH] (ref 7.35–7.45)
PLASMA CELLS # BLD MANUAL: 1 % (ref 0–0)
PLATELET # BLD AUTO: 353 10*3/UL (ref 130–400)
PLATELET SUFFICIENCY: NORMAL
PMV BLD AUTO: 12.3 FL (ref 9.6–12.3)
PO2 BLDA: 152 MMHG (ref 80–90)
POLYCHROMASIA BLD QL SMEAR: SLIGHT
POTASSIUM SERPL-SCNC: 4.5 MMOL/L (ref 3.5–5.1)
POTASSIUM SERPL-SCNC: 4.9 MMOL/L (ref 3.5–5.1)
PROT SERPL-MCNC: 6.2 GM/DL (ref 6.4–8.2)
RBC # BLD AUTO: 3.33 10*6/UL (ref 4.5–5.9)
SAO2 % BLDA: 98 % (ref 95–97)
SODIUM SERPL-SCNC: 149 MMOL/L (ref 136–145)
SODIUM SERPL-SCNC: 150 MMOL/L (ref 136–145)
TOTAL CELLS COUNTED: 100 #CELLS
VARIANT LYMPHS NFR BLD MANUAL: 3 % (ref 0–0)
WBC NRBC COR # BLD AUTO: 9.7 10*3/UL (ref 4.8–10.8)

## 2020-12-29 PROCEDURE — 5A09357 ASSISTANCE WITH RESPIRATORY VENTILATION, LESS THAN 24 CONSECUTIVE HOURS, CONTINUOUS POSITIVE AIRWAY PRESSURE: ICD-10-PCS | Performed by: INTERNAL MEDICINE

## 2020-12-29 PROCEDURE — 4A133B1 MONITORING OF ARTERIAL PRESSURE, PERIPHERAL, PERCUTANEOUS APPROACH: ICD-10-PCS | Performed by: NURSE ANESTHETIST, CERTIFIED REGISTERED

## 2020-12-29 PROCEDURE — 4A133J1 MONITORING OF ARTERIAL PULSE, PERIPHERAL, PERCUTANEOUS APPROACH: ICD-10-PCS | Performed by: NURSE ANESTHETIST, CERTIFIED REGISTERED

## 2020-12-29 PROCEDURE — 03HY32Z INSERTION OF MONITORING DEVICE INTO UPPER ARTERY, PERCUTANEOUS APPROACH: ICD-10-PCS | Performed by: NURSE ANESTHETIST, CERTIFIED REGISTERED

## 2020-12-29 NOTE — NUR
ON ASSESSMENT PATIENT ALERT, ABLE TO TELL ME HE IS IN "[facility]
FOR COPD". HE'S VERY WEAK, BUT MOVING ALL EXTREMITIES. HE'S ABLE TO EAT AND
DRINK, TAKES PILLS WHOLE WITHOUT DIFFICULTY. RIJ MLC INTACT. AT 0740 HIS
HEPARIN DRIP RESTARTED AT 15UNITS/KG/MIN OR 1350UNITS/HR. BARRETT CATH PATENT
CLEAR KITTY URINE. IV SITE REMOVED LEFT ANTECUBITAL. TEX'S/SCD'S IN PLACE. PT
FED HIS BREAKFAST. SEE ALL APPROPRIATE INTERVENTIONS.

## 2020-12-29 NOTE — NUR
PT TRANSFERRED IN STABLE CONDITION TO [facility] WITH ALL OF HIS BELONGINGS. PLACED
ON TELEMETRY.

## 2020-12-29 NOTE — NUR
PHYSICAL THERAPY
 
Physical therapy evaluation order recieved and chart reviewed. Checked with
nursing prior to evaluation. Attempted to complete evaluation however patient
was minimally responsive to simple commands throughout. Pt was unable to
respond to questioning with simple head shaking yes or no. Intermittently
patient was able to squeeze hand when verbally asked to. No other physical
movement was noted with patient supine. Determined that patient was not
appropriate to attempt an evaluation at this time due to minimal to no
participation. Pt was adjusted in bed with pillows and wedges in order to
facilitate proper positioning. Communinicated with nursing staff of patients
status and needs regarding positioning. Will attempt to see patient at a later
time when appropriate. thanks
Mary Stern PT DPT

## 2020-12-29 NOTE — NUR
PHARMACIST KASEY WEAVER STATES REMDESEVIR DOSE UNAVAILABLE AT THIS TIME BUT
SHIPMENT SHOULD BE IN LATER TODAY.

## 2020-12-29 NOTE — NUR
OT NOTE
Occupational therapy order received, chart reviewed, and attempted to see
patient at bedside. RN and respiratory gave approval to see the patient for an
OT evaluation. Patient was on the BiPap at 95% SpO2. Patient was minimally
responsive to simple commands throughout attempt to see patient. He was unable
to demonstrate AROM of any extremity, complete yes/no head nods for simple
orientation questions, or verbalize. He was able to squeeze his hand with 2/4
trials. Patient is not appropriate for a complete OT evaluation at this time.
Patient was repositioned in bed with wedges for proper positioning. Nursing
staff made aware of patient's functional status. Will check back at a later
date for completion of an OT evaluation. Thank you.
 
Maria M Sanchez OTR/L

## 2020-12-29 NOTE — NUR
PATIENT TRANSFERRED TO THE ICU WITH RN AND RESPIRATORY. TRANSFERRED WEARING
NASAL CANNULA AND WAS IMMEDIATELY PLACED ON BIPAP WHEN HE HAD REACHED THE ICU.
MARLENI ANESTHESIOLOGIST HERE TO INTUBATE PATIENT PER DR MOCTEZUMA'S ORDER.

## 2020-12-29 NOTE — NUR
INTO PATIENTS ROOM TO ASSESS PATIENT. PATIENT FOUND TO BE LAYING ON HIS RIGHT
SIDE. DROOLING FROM RIGHT SIDE OF MOUTH. PATIENT UNRESPONSIVE. OPENS EYES TO
PAINFUL STIMULI ONLY. FOLLOWS NO COMMANDS. VSS. TEMP 98.0, PULSE IN THE 90'S,
RESPIRATIONS 28 PER MINUTE WITH LABORED BREATHING NOTED. BLOOD PRESSURE
128/76. PATIENT 100% ON 8L HIGH FLOW. BLOOD SUGAR 115.
CALLED DR. VIVAS FROM PATIENTS ROOM. STAT CT ORDERED AND STAT ABGS ORDERED.
RESPIRATORY NOTIFIED.

## 2020-12-30 VITALS — DIASTOLIC BLOOD PRESSURE: 62 MMHG

## 2020-12-30 VITALS — SYSTOLIC BLOOD PRESSURE: 112 MMHG | DIASTOLIC BLOOD PRESSURE: 69 MMHG

## 2020-12-30 VITALS — DIASTOLIC BLOOD PRESSURE: 74 MMHG

## 2020-12-30 VITALS — DIASTOLIC BLOOD PRESSURE: 68 MMHG | SYSTOLIC BLOOD PRESSURE: 107 MMHG

## 2020-12-30 VITALS — DIASTOLIC BLOOD PRESSURE: 59 MMHG | SYSTOLIC BLOOD PRESSURE: 95 MMHG

## 2020-12-30 VITALS — DIASTOLIC BLOOD PRESSURE: 60 MMHG

## 2020-12-30 VITALS — SYSTOLIC BLOOD PRESSURE: 132 MMHG | DIASTOLIC BLOOD PRESSURE: 71 MMHG

## 2020-12-30 VITALS — DIASTOLIC BLOOD PRESSURE: 63 MMHG

## 2020-12-30 VITALS — DIASTOLIC BLOOD PRESSURE: 36 MMHG | SYSTOLIC BLOOD PRESSURE: 60 MMHG

## 2020-12-30 VITALS — DIASTOLIC BLOOD PRESSURE: 68 MMHG

## 2020-12-30 VITALS — DIASTOLIC BLOOD PRESSURE: 65 MMHG | SYSTOLIC BLOOD PRESSURE: 107 MMHG

## 2020-12-30 VITALS — DIASTOLIC BLOOD PRESSURE: 55 MMHG | SYSTOLIC BLOOD PRESSURE: 85 MMHG

## 2020-12-30 VITALS — DIASTOLIC BLOOD PRESSURE: 65 MMHG | SYSTOLIC BLOOD PRESSURE: 117 MMHG

## 2020-12-30 VITALS — DIASTOLIC BLOOD PRESSURE: 64 MMHG

## 2020-12-30 VITALS — DIASTOLIC BLOOD PRESSURE: 65 MMHG

## 2020-12-30 VITALS — SYSTOLIC BLOOD PRESSURE: 110 MMHG | DIASTOLIC BLOOD PRESSURE: 67 MMHG

## 2020-12-30 VITALS — DIASTOLIC BLOOD PRESSURE: 48 MMHG | SYSTOLIC BLOOD PRESSURE: 72 MMHG

## 2020-12-30 VITALS — DIASTOLIC BLOOD PRESSURE: 66 MMHG | SYSTOLIC BLOOD PRESSURE: 105 MMHG

## 2020-12-30 VITALS — SYSTOLIC BLOOD PRESSURE: 109 MMHG | DIASTOLIC BLOOD PRESSURE: 61 MMHG

## 2020-12-30 VITALS — DIASTOLIC BLOOD PRESSURE: 56 MMHG | SYSTOLIC BLOOD PRESSURE: 88 MMHG

## 2020-12-30 VITALS — SYSTOLIC BLOOD PRESSURE: 116 MMHG | DIASTOLIC BLOOD PRESSURE: 65 MMHG

## 2020-12-30 VITALS — DIASTOLIC BLOOD PRESSURE: 48 MMHG

## 2020-12-30 VITALS — SYSTOLIC BLOOD PRESSURE: 103 MMHG | DIASTOLIC BLOOD PRESSURE: 62 MMHG

## 2020-12-30 VITALS — DIASTOLIC BLOOD PRESSURE: 63 MMHG | SYSTOLIC BLOOD PRESSURE: 112 MMHG

## 2020-12-30 VITALS — SYSTOLIC BLOOD PRESSURE: 115 MMHG | DIASTOLIC BLOOD PRESSURE: 65 MMHG

## 2020-12-30 VITALS — SYSTOLIC BLOOD PRESSURE: 100 MMHG | DIASTOLIC BLOOD PRESSURE: 60 MMHG

## 2020-12-30 VITALS — DIASTOLIC BLOOD PRESSURE: 31 MMHG | SYSTOLIC BLOOD PRESSURE: 50 MMHG

## 2020-12-30 VITALS — DIASTOLIC BLOOD PRESSURE: 61 MMHG

## 2020-12-30 VITALS — SYSTOLIC BLOOD PRESSURE: 109 MMHG | DIASTOLIC BLOOD PRESSURE: 67 MMHG

## 2020-12-30 VITALS — SYSTOLIC BLOOD PRESSURE: 86 MMHG | DIASTOLIC BLOOD PRESSURE: 55 MMHG

## 2020-12-30 VITALS — DIASTOLIC BLOOD PRESSURE: 59 MMHG | SYSTOLIC BLOOD PRESSURE: 93 MMHG

## 2020-12-30 VITALS — SYSTOLIC BLOOD PRESSURE: 110 MMHG | DIASTOLIC BLOOD PRESSURE: 69 MMHG

## 2020-12-30 VITALS — DIASTOLIC BLOOD PRESSURE: 52 MMHG

## 2020-12-30 VITALS — SYSTOLIC BLOOD PRESSURE: 111 MMHG | DIASTOLIC BLOOD PRESSURE: 68 MMHG

## 2020-12-30 VITALS — DIASTOLIC BLOOD PRESSURE: 60 MMHG | SYSTOLIC BLOOD PRESSURE: 95 MMHG

## 2020-12-30 VITALS — DIASTOLIC BLOOD PRESSURE: 54 MMHG

## 2020-12-30 VITALS — DIASTOLIC BLOOD PRESSURE: 66 MMHG

## 2020-12-30 VITALS — SYSTOLIC BLOOD PRESSURE: 76 MMHG | DIASTOLIC BLOOD PRESSURE: 48 MMHG

## 2020-12-30 VITALS — DIASTOLIC BLOOD PRESSURE: 59 MMHG

## 2020-12-30 VITALS — SYSTOLIC BLOOD PRESSURE: 111 MMHG | DIASTOLIC BLOOD PRESSURE: 64 MMHG

## 2020-12-30 VITALS — DIASTOLIC BLOOD PRESSURE: 62 MMHG | SYSTOLIC BLOOD PRESSURE: 99 MMHG

## 2020-12-30 VITALS — SYSTOLIC BLOOD PRESSURE: 104 MMHG | DIASTOLIC BLOOD PRESSURE: 65 MMHG

## 2020-12-30 VITALS — SYSTOLIC BLOOD PRESSURE: 103 MMHG | DIASTOLIC BLOOD PRESSURE: 66 MMHG

## 2020-12-30 VITALS — DIASTOLIC BLOOD PRESSURE: 38 MMHG

## 2020-12-30 VITALS — DIASTOLIC BLOOD PRESSURE: 68 MMHG | SYSTOLIC BLOOD PRESSURE: 109 MMHG

## 2020-12-30 VITALS — SYSTOLIC BLOOD PRESSURE: 80 MMHG | DIASTOLIC BLOOD PRESSURE: 43 MMHG

## 2020-12-30 VITALS — DIASTOLIC BLOOD PRESSURE: 61 MMHG | SYSTOLIC BLOOD PRESSURE: 91 MMHG

## 2020-12-30 VITALS — DIASTOLIC BLOOD PRESSURE: 56 MMHG

## 2020-12-30 VITALS — DIASTOLIC BLOOD PRESSURE: 53 MMHG

## 2020-12-30 VITALS — SYSTOLIC BLOOD PRESSURE: 121 MMHG | DIASTOLIC BLOOD PRESSURE: 65 MMHG

## 2020-12-30 VITALS — DIASTOLIC BLOOD PRESSURE: 67 MMHG | SYSTOLIC BLOOD PRESSURE: 108 MMHG

## 2020-12-30 VITALS — DIASTOLIC BLOOD PRESSURE: 69 MMHG

## 2020-12-30 VITALS — DIASTOLIC BLOOD PRESSURE: 51 MMHG

## 2020-12-30 VITALS — SYSTOLIC BLOOD PRESSURE: 87 MMHG | DIASTOLIC BLOOD PRESSURE: 51 MMHG

## 2020-12-30 VITALS — DIASTOLIC BLOOD PRESSURE: 67 MMHG | SYSTOLIC BLOOD PRESSURE: 117 MMHG

## 2020-12-30 VITALS — DIASTOLIC BLOOD PRESSURE: 70 MMHG

## 2020-12-30 VITALS — SYSTOLIC BLOOD PRESSURE: 110 MMHG | DIASTOLIC BLOOD PRESSURE: 59 MMHG

## 2020-12-30 VITALS — DIASTOLIC BLOOD PRESSURE: 42 MMHG

## 2020-12-30 VITALS — DIASTOLIC BLOOD PRESSURE: 55 MMHG

## 2020-12-30 VITALS — SYSTOLIC BLOOD PRESSURE: 102 MMHG | DIASTOLIC BLOOD PRESSURE: 66 MMHG

## 2020-12-30 LAB
ALBUMIN SERPL-MCNC: 2.2 GM/DL (ref 3.1–4.5)
ALP SERPL-CCNC: 59 U/L (ref 45–117)
ALT SERPL W P-5'-P-CCNC: 41 U/L (ref 12–78)
APTT PPP: 21.1 SECONDS (ref 20–32.1)
AST SERPL-CCNC: 27 IU/L (ref 3–35)
BACTERIA #/AREA URNS HPF: (no result) /[HPF]
BASE EXCESS BLDA CALC-SCNC: 10.1 MMOL/L (ref -2–2)
BASE EXCESS BLDA CALC-SCNC: 8.5 MMOL/L (ref -2–2)
BASE EXCESS BLDA CALC-SCNC: 8.8 MMOL/L (ref -2–2)
BUN SERPL-MCNC: 60 MG/DL (ref 7–24)
CASTS URNS QL MICRO: (no result)
CHLORIDE SERPL-SCNC: 103 MMOL/L (ref 98–107)
CREAT SERPL-MCNC: 1.62 MG/DL (ref 0.7–1.3)
EPI CELLS #/AREA URNS HPF: (no result) /[HPF]
ERYTHROCYTE [DISTWIDTH] IN BLOOD BY AUTOMATED COUNT: 15 % (ref 0–14.5)
HCT VFR BLD AUTO: 32.1 % (ref 42–52)
HGB UR QL STRIP: (no result)
MCH RBC QN AUTO: 27.6 PG (ref 27–31)
MCHC RBC AUTO-ENTMCNC: 28.3 G/DL (ref 33–37)
MCV RBC AUTO: 97.3 FL (ref 80–94)
MUCOUS THREADS URNS QL MICRO: (no result)
NRBC BLD QL AUTO: 0.3 % (ref 0–0)
PCO2 BLDA: 69 MMHG (ref 35–45)
PCO2 BLDA: 70 MMHG (ref 35–45)
PCO2 BLDA: 72 MMHG (ref 35–45)
PH BLDA: 7.32 [PH] (ref 7.35–7.45)
PH BLDA: 7.34 [PH] (ref 7.35–7.45)
PH BLDA: 7.34 [PH] (ref 7.35–7.45)
PH UR STRIP: 6 [PH] (ref 4.5–8)
PLATELET # BLD AUTO: 485 10*3/UL (ref 130–400)
PLATELET SUFFICIENCY: (no result)
PMV BLD AUTO: 11.8 FL (ref 9.6–12.3)
PO2 BLDA: 312 MMHG (ref 80–90)
PO2 BLDA: 71 MMHG (ref 80–90)
PO2 BLDA: 77 MMHG (ref 80–90)
POTASSIUM SERPL-SCNC: 4.4 MMOL/L (ref 3.5–5.1)
PROT SERPL-MCNC: 5.8 GM/DL (ref 6.4–8.2)
RBC # BLD AUTO: 3.3 10*6/UL (ref 4.5–5.9)
RBC #/AREA URNS HPF: (no result) RBC/HPF (ref 0–2)
SAO2 % BLDA: 93 % (ref 95–97)
SAO2 % BLDA: 96 % (ref 95–97)
SAO2 % BLDA: 99 % (ref 95–97)
SODIUM SERPL-SCNC: 143 MMOL/L (ref 136–145)
SP GR UR: 1.02 (ref 1–1.03)
TOTAL CELLS COUNTED: 100 #CELLS
UROBILINOGEN UR STRIP-MCNC: 0.2 E.U./DL (ref 0–1)
VARIANT LYMPHS NFR BLD MANUAL: 1 % (ref 0–0)
WBC #/AREA URNS HPF: (no result) WBC/HPF (ref 0–5)
WBC NRBC COR # BLD AUTO: 11.8 10*3/UL (ref 4.8–10.8)

## 2020-12-30 PROCEDURE — 5A1955Z RESPIRATORY VENTILATION, GREATER THAN 96 CONSECUTIVE HOURS: ICD-10-PCS | Performed by: NURSE ANESTHETIST, CERTIFIED REGISTERED

## 2020-12-30 PROCEDURE — 0BH17EZ INSERTION OF ENDOTRACHEAL AIRWAY INTO TRACHEA, VIA NATURAL OR ARTIFICIAL OPENING: ICD-10-PCS | Performed by: NURSE ANESTHETIST, CERTIFIED REGISTERED

## 2020-12-30 NOTE — NUR
REMAINS INTUBATED, ETT SECURE
OGT PALCEMENT CHECKED
PT HAS BEEN MAXED ON LEVAPHED
ART ZERO CALIBRATED AND LEVELED

## 2020-12-30 NOTE — NUR
PHYSICAL THERAPY
 
Physical therapy order received when patient was admitted to the fourth floor.
Patient was transferred and intubated on a vent on 12/29/2020. Patient is not
appropriate for physical therapy at this time. Will need new orders when
patient is medically stable and appropriate for an evaluation. Thank you.
 
Mary Stern PT DPT

## 2020-12-30 NOTE — NUR
OT NOTE
Occupational therapy order received when patient was admitted to the fourth
floor. Patient was transferred and intubated on a vent on 12/29/2020. Patient
is not appropriate for occupational therapy at this time. Will need new orders
when patient is medically stable and appropriate for an evaluation. Thank you.
 
 
Maria M Sanchez, OTR/L

## 2020-12-30 NOTE — NUR
ART LINE PLACED BY MARLENI ANESTHESIOLOGIST. TOLERATED WELL. BLOOD PRESSURE LOW,
LEVOPHED TO BE STARTED

## 2020-12-31 VITALS — DIASTOLIC BLOOD PRESSURE: 63 MMHG

## 2020-12-31 VITALS — DIASTOLIC BLOOD PRESSURE: 69 MMHG | SYSTOLIC BLOOD PRESSURE: 116 MMHG

## 2020-12-31 VITALS — DIASTOLIC BLOOD PRESSURE: 66 MMHG | SYSTOLIC BLOOD PRESSURE: 107 MMHG

## 2020-12-31 VITALS — DIASTOLIC BLOOD PRESSURE: 72 MMHG | SYSTOLIC BLOOD PRESSURE: 121 MMHG

## 2020-12-31 VITALS — SYSTOLIC BLOOD PRESSURE: 104 MMHG | DIASTOLIC BLOOD PRESSURE: 62 MMHG

## 2020-12-31 VITALS — DIASTOLIC BLOOD PRESSURE: 64 MMHG | SYSTOLIC BLOOD PRESSURE: 102 MMHG

## 2020-12-31 VITALS — SYSTOLIC BLOOD PRESSURE: 137 MMHG | DIASTOLIC BLOOD PRESSURE: 77 MMHG

## 2020-12-31 VITALS — SYSTOLIC BLOOD PRESSURE: 120 MMHG | DIASTOLIC BLOOD PRESSURE: 87 MMHG

## 2020-12-31 VITALS — SYSTOLIC BLOOD PRESSURE: 140 MMHG | DIASTOLIC BLOOD PRESSURE: 74 MMHG

## 2020-12-31 VITALS — SYSTOLIC BLOOD PRESSURE: 118 MMHG | DIASTOLIC BLOOD PRESSURE: 70 MMHG

## 2020-12-31 VITALS — DIASTOLIC BLOOD PRESSURE: 77 MMHG | SYSTOLIC BLOOD PRESSURE: 150 MMHG

## 2020-12-31 VITALS — DIASTOLIC BLOOD PRESSURE: 64 MMHG | SYSTOLIC BLOOD PRESSURE: 110 MMHG

## 2020-12-31 VITALS — DIASTOLIC BLOOD PRESSURE: 57 MMHG | SYSTOLIC BLOOD PRESSURE: 91 MMHG

## 2020-12-31 VITALS — DIASTOLIC BLOOD PRESSURE: 64 MMHG | SYSTOLIC BLOOD PRESSURE: 106 MMHG

## 2020-12-31 VITALS — DIASTOLIC BLOOD PRESSURE: 69 MMHG

## 2020-12-31 VITALS — DIASTOLIC BLOOD PRESSURE: 66 MMHG | SYSTOLIC BLOOD PRESSURE: 110 MMHG

## 2020-12-31 VITALS — DIASTOLIC BLOOD PRESSURE: 68 MMHG

## 2020-12-31 VITALS — DIASTOLIC BLOOD PRESSURE: 58 MMHG | SYSTOLIC BLOOD PRESSURE: 93 MMHG

## 2020-12-31 VITALS — DIASTOLIC BLOOD PRESSURE: 56 MMHG

## 2020-12-31 VITALS — DIASTOLIC BLOOD PRESSURE: 55 MMHG | SYSTOLIC BLOOD PRESSURE: 93 MMHG

## 2020-12-31 VITALS — DIASTOLIC BLOOD PRESSURE: 70 MMHG | SYSTOLIC BLOOD PRESSURE: 125 MMHG

## 2020-12-31 VITALS — SYSTOLIC BLOOD PRESSURE: 116 MMHG | DIASTOLIC BLOOD PRESSURE: 67 MMHG

## 2020-12-31 VITALS — SYSTOLIC BLOOD PRESSURE: 120 MMHG | DIASTOLIC BLOOD PRESSURE: 69 MMHG

## 2020-12-31 VITALS — SYSTOLIC BLOOD PRESSURE: 110 MMHG | DIASTOLIC BLOOD PRESSURE: 64 MMHG

## 2020-12-31 VITALS — SYSTOLIC BLOOD PRESSURE: 106 MMHG | DIASTOLIC BLOOD PRESSURE: 67 MMHG

## 2020-12-31 VITALS — SYSTOLIC BLOOD PRESSURE: 101 MMHG | DIASTOLIC BLOOD PRESSURE: 60 MMHG

## 2020-12-31 VITALS — SYSTOLIC BLOOD PRESSURE: 110 MMHG | DIASTOLIC BLOOD PRESSURE: 67 MMHG

## 2020-12-31 VITALS — SYSTOLIC BLOOD PRESSURE: 102 MMHG | DIASTOLIC BLOOD PRESSURE: 60 MMHG

## 2020-12-31 VITALS — DIASTOLIC BLOOD PRESSURE: 72 MMHG | SYSTOLIC BLOOD PRESSURE: 123 MMHG

## 2020-12-31 VITALS — SYSTOLIC BLOOD PRESSURE: 101 MMHG | DIASTOLIC BLOOD PRESSURE: 63 MMHG

## 2020-12-31 VITALS — DIASTOLIC BLOOD PRESSURE: 58 MMHG

## 2020-12-31 VITALS — DIASTOLIC BLOOD PRESSURE: 61 MMHG

## 2020-12-31 VITALS — DIASTOLIC BLOOD PRESSURE: 64 MMHG | SYSTOLIC BLOOD PRESSURE: 108 MMHG

## 2020-12-31 VITALS — DIASTOLIC BLOOD PRESSURE: 63 MMHG | SYSTOLIC BLOOD PRESSURE: 101 MMHG

## 2020-12-31 VITALS — SYSTOLIC BLOOD PRESSURE: 95 MMHG | DIASTOLIC BLOOD PRESSURE: 56 MMHG

## 2020-12-31 VITALS — DIASTOLIC BLOOD PRESSURE: 66 MMHG

## 2020-12-31 VITALS — SYSTOLIC BLOOD PRESSURE: 115 MMHG | DIASTOLIC BLOOD PRESSURE: 69 MMHG

## 2020-12-31 VITALS — DIASTOLIC BLOOD PRESSURE: 65 MMHG

## 2020-12-31 VITALS — DIASTOLIC BLOOD PRESSURE: 64 MMHG

## 2020-12-31 VITALS — DIASTOLIC BLOOD PRESSURE: 69 MMHG | SYSTOLIC BLOOD PRESSURE: 121 MMHG

## 2020-12-31 VITALS — DIASTOLIC BLOOD PRESSURE: 67 MMHG | SYSTOLIC BLOOD PRESSURE: 112 MMHG

## 2020-12-31 VITALS — DIASTOLIC BLOOD PRESSURE: 85 MMHG | SYSTOLIC BLOOD PRESSURE: 116 MMHG

## 2020-12-31 VITALS — DIASTOLIC BLOOD PRESSURE: 50 MMHG | SYSTOLIC BLOOD PRESSURE: 81 MMHG

## 2020-12-31 VITALS — DIASTOLIC BLOOD PRESSURE: 60 MMHG | SYSTOLIC BLOOD PRESSURE: 101 MMHG

## 2020-12-31 VITALS — SYSTOLIC BLOOD PRESSURE: 124 MMHG | DIASTOLIC BLOOD PRESSURE: 70 MMHG

## 2020-12-31 VITALS — DIASTOLIC BLOOD PRESSURE: 67 MMHG

## 2020-12-31 VITALS — DIASTOLIC BLOOD PRESSURE: 71 MMHG

## 2020-12-31 VITALS — DIASTOLIC BLOOD PRESSURE: 66 MMHG | SYSTOLIC BLOOD PRESSURE: 104 MMHG

## 2020-12-31 VITALS — DIASTOLIC BLOOD PRESSURE: 59 MMHG

## 2020-12-31 VITALS — DIASTOLIC BLOOD PRESSURE: 67 MMHG | SYSTOLIC BLOOD PRESSURE: 115 MMHG

## 2020-12-31 VITALS — DIASTOLIC BLOOD PRESSURE: 65 MMHG | SYSTOLIC BLOOD PRESSURE: 104 MMHG

## 2020-12-31 VITALS — DIASTOLIC BLOOD PRESSURE: 66 MMHG | SYSTOLIC BLOOD PRESSURE: 114 MMHG

## 2020-12-31 VITALS — DIASTOLIC BLOOD PRESSURE: 62 MMHG | SYSTOLIC BLOOD PRESSURE: 103 MMHG

## 2020-12-31 VITALS — DIASTOLIC BLOOD PRESSURE: 68 MMHG | SYSTOLIC BLOOD PRESSURE: 112 MMHG

## 2020-12-31 VITALS — DIASTOLIC BLOOD PRESSURE: 75 MMHG

## 2020-12-31 VITALS — DIASTOLIC BLOOD PRESSURE: 70 MMHG

## 2020-12-31 VITALS — SYSTOLIC BLOOD PRESSURE: 111 MMHG | DIASTOLIC BLOOD PRESSURE: 66 MMHG

## 2020-12-31 VITALS — DIASTOLIC BLOOD PRESSURE: 74 MMHG

## 2020-12-31 VITALS — DIASTOLIC BLOOD PRESSURE: 55 MMHG | SYSTOLIC BLOOD PRESSURE: 94 MMHG

## 2020-12-31 VITALS — DIASTOLIC BLOOD PRESSURE: 69 MMHG | SYSTOLIC BLOOD PRESSURE: 129 MMHG

## 2020-12-31 VITALS — DIASTOLIC BLOOD PRESSURE: 68 MMHG | SYSTOLIC BLOOD PRESSURE: 111 MMHG

## 2020-12-31 VITALS — SYSTOLIC BLOOD PRESSURE: 113 MMHG | DIASTOLIC BLOOD PRESSURE: 71 MMHG

## 2020-12-31 VITALS — DIASTOLIC BLOOD PRESSURE: 52 MMHG

## 2020-12-31 VITALS — DIASTOLIC BLOOD PRESSURE: 54 MMHG

## 2020-12-31 VITALS — DIASTOLIC BLOOD PRESSURE: 66 MMHG | SYSTOLIC BLOOD PRESSURE: 113 MMHG

## 2020-12-31 LAB
ALBUMIN SERPL-MCNC: 2.2 GM/DL (ref 3.1–4.5)
ALP SERPL-CCNC: 53 U/L (ref 45–117)
ALT SERPL W P-5'-P-CCNC: 31 U/L (ref 12–78)
AST SERPL-CCNC: 27 IU/L (ref 3–35)
BASE EXCESS BLDA CALC-SCNC: 9.9 MMOL/L (ref -2–2)
BASOPHILS # BLD AUTO: 0 10*3/UL (ref 0–0.1)
BASOPHILS NFR BLD AUTO: 0.1 % (ref 0–1)
BUN SERPL-MCNC: 42 MG/DL (ref 7–24)
CHLORIDE SERPL-SCNC: 103 MMOL/L (ref 98–107)
CREAT SERPL-MCNC: 1.08 MG/DL (ref 0.7–1.3)
EOSINOPHIL # BLD AUTO: 0.1 10*3/UL (ref 0–0.4)
EOSINOPHIL # BLD AUTO: 0.4 % (ref 1–4)
ERYTHROCYTE [DISTWIDTH] IN BLOOD BY AUTOMATED COUNT: 14.7 % (ref 0–14.5)
HCT VFR BLD AUTO: 27.7 % (ref 42–52)
LYMPHOCYTES # BLD AUTO: 2.4 10*3/UL (ref 1.3–4.4)
LYMPHOCYTES NFR BLD AUTO: 21.2 % (ref 27–41)
MCH RBC QN AUTO: 27.6 PG (ref 27–31)
MCHC RBC AUTO-ENTMCNC: 28.9 G/DL (ref 33–37)
MCV RBC AUTO: 95.5 FL (ref 80–94)
MONOCYTES # BLD AUTO: 0.8 10*3/UL (ref 0.1–1)
MONOCYTES NFR BLD MANUAL: 7.2 % (ref 3–9)
NEUT #: 7.7 10*3/UL (ref 2.3–7.9)
NEUT %: 69 % (ref 47–73)
NRBC BLD QL AUTO: 0.2 % (ref 0–0)
PCO2 BLDA: 73 MMHG (ref 35–45)
PH BLDA: 7.33 [PH] (ref 7.35–7.45)
PLATELET # BLD AUTO: 454 10*3/UL (ref 130–400)
PMV BLD AUTO: 11.5 FL (ref 9.6–12.3)
PO2 BLDA: 82 MMHG (ref 80–90)
POTASSIUM SERPL-SCNC: 3.9 MMOL/L (ref 3.5–5.1)
PROT SERPL-MCNC: 5.3 GM/DL (ref 6.4–8.2)
RBC # BLD AUTO: 2.9 10*6/UL (ref 4.5–5.9)
SAO2 % BLDA: 96 % (ref 95–97)
SODIUM SERPL-SCNC: 143 MMOL/L (ref 136–145)
WBC NRBC COR # BLD AUTO: 11.2 10*3/UL (ref 4.8–10.8)

## 2020-12-31 NOTE — NUR
PT. RESTING IN BED. ADEQUATELY SEDATED ON DIPROVAN DRIP.  LUNGS DIMINISHED
BILAT, PULSE % ON 35% FIO2. ABDOMEN SOFT, NONDISTENDED AND NORMO.
DEPENDENT EDEMA.  L BRACHIAL ARTERY INTACT, RIJ MLC INTACT.  BARRETT DRAINING A
CLEAR YELLOW URINE.  ORAL MOUTH CARE GIVEN AND PT SUCTIONED FOR LARGE AMT OF
THICK YELLOW MUCOUS AND LARGE AMT OF CLEAR ORAL SECRETIONS.
LISBETH CAREY RN

## 2020-12-31 NOTE — NUR
PT FAILED CPAP, HR  AND LABORED RESPS, RETURNED TO VENT SETTINGS AND
SEDATION RESUMED AT 15, LEVAPHED TITRATED UP AS BP ALSO DROPPED TO MAP 58

## 2020-12-31 NOTE — NUR
AM LABS DRAWN AT THIS TIME. PATIENT AWAKENS TO VOICE BUT DRIFTS QUIETLY TO
SLEEP. NO SIGNS OR SYMPTOMS OF DISTRESS. NO SECRETIONS ORALLY OR IN THE ET
TUBE.

## 2021-01-01 VITALS — SYSTOLIC BLOOD PRESSURE: 106 MMHG | DIASTOLIC BLOOD PRESSURE: 68 MMHG

## 2021-01-01 VITALS — SYSTOLIC BLOOD PRESSURE: 98 MMHG | DIASTOLIC BLOOD PRESSURE: 71 MMHG

## 2021-01-01 VITALS — SYSTOLIC BLOOD PRESSURE: 105 MMHG | DIASTOLIC BLOOD PRESSURE: 68 MMHG

## 2021-01-01 VITALS — DIASTOLIC BLOOD PRESSURE: 72 MMHG

## 2021-01-01 VITALS — SYSTOLIC BLOOD PRESSURE: 103 MMHG | DIASTOLIC BLOOD PRESSURE: 67 MMHG

## 2021-01-01 VITALS — DIASTOLIC BLOOD PRESSURE: 71 MMHG

## 2021-01-01 VITALS — SYSTOLIC BLOOD PRESSURE: 75 MMHG | DIASTOLIC BLOOD PRESSURE: 55 MMHG

## 2021-01-01 VITALS — DIASTOLIC BLOOD PRESSURE: 60 MMHG

## 2021-01-01 VITALS — DIASTOLIC BLOOD PRESSURE: 71 MMHG | SYSTOLIC BLOOD PRESSURE: 108 MMHG

## 2021-01-01 VITALS — SYSTOLIC BLOOD PRESSURE: 110 MMHG | DIASTOLIC BLOOD PRESSURE: 71 MMHG

## 2021-01-01 VITALS — DIASTOLIC BLOOD PRESSURE: 77 MMHG | SYSTOLIC BLOOD PRESSURE: 125 MMHG

## 2021-01-01 VITALS — SYSTOLIC BLOOD PRESSURE: 90 MMHG | DIASTOLIC BLOOD PRESSURE: 60 MMHG

## 2021-01-01 VITALS — SYSTOLIC BLOOD PRESSURE: 134 MMHG | DIASTOLIC BLOOD PRESSURE: 88 MMHG

## 2021-01-01 VITALS — DIASTOLIC BLOOD PRESSURE: 54 MMHG

## 2021-01-01 VITALS — DIASTOLIC BLOOD PRESSURE: 62 MMHG | SYSTOLIC BLOOD PRESSURE: 80 MMHG

## 2021-01-01 VITALS — DIASTOLIC BLOOD PRESSURE: 65 MMHG | SYSTOLIC BLOOD PRESSURE: 98 MMHG

## 2021-01-01 VITALS — SYSTOLIC BLOOD PRESSURE: 130 MMHG | DIASTOLIC BLOOD PRESSURE: 80 MMHG

## 2021-01-01 VITALS — DIASTOLIC BLOOD PRESSURE: 68 MMHG

## 2021-01-01 VITALS — SYSTOLIC BLOOD PRESSURE: 96 MMHG | DIASTOLIC BLOOD PRESSURE: 64 MMHG

## 2021-01-01 VITALS — DIASTOLIC BLOOD PRESSURE: 66 MMHG

## 2021-01-01 VITALS — DIASTOLIC BLOOD PRESSURE: 67 MMHG | SYSTOLIC BLOOD PRESSURE: 103 MMHG

## 2021-01-01 VITALS — DIASTOLIC BLOOD PRESSURE: 61 MMHG | SYSTOLIC BLOOD PRESSURE: 83 MMHG

## 2021-01-01 VITALS — SYSTOLIC BLOOD PRESSURE: 100 MMHG | DIASTOLIC BLOOD PRESSURE: 66 MMHG

## 2021-01-01 VITALS — SYSTOLIC BLOOD PRESSURE: 96 MMHG | DIASTOLIC BLOOD PRESSURE: 68 MMHG

## 2021-01-01 VITALS — DIASTOLIC BLOOD PRESSURE: 68 MMHG | SYSTOLIC BLOOD PRESSURE: 105 MMHG

## 2021-01-01 VITALS — DIASTOLIC BLOOD PRESSURE: 88 MMHG

## 2021-01-01 VITALS — DIASTOLIC BLOOD PRESSURE: 62 MMHG | SYSTOLIC BLOOD PRESSURE: 94 MMHG

## 2021-01-01 VITALS — DIASTOLIC BLOOD PRESSURE: 75 MMHG | SYSTOLIC BLOOD PRESSURE: 122 MMHG

## 2021-01-01 VITALS — DIASTOLIC BLOOD PRESSURE: 60 MMHG | SYSTOLIC BLOOD PRESSURE: 86 MMHG

## 2021-01-01 VITALS — DIASTOLIC BLOOD PRESSURE: 55 MMHG | SYSTOLIC BLOOD PRESSURE: 88 MMHG

## 2021-01-01 VITALS — SYSTOLIC BLOOD PRESSURE: 91 MMHG | DIASTOLIC BLOOD PRESSURE: 42 MMHG

## 2021-01-01 VITALS — SYSTOLIC BLOOD PRESSURE: 75 MMHG | DIASTOLIC BLOOD PRESSURE: 54 MMHG

## 2021-01-01 VITALS — DIASTOLIC BLOOD PRESSURE: 68 MMHG | SYSTOLIC BLOOD PRESSURE: 106 MMHG

## 2021-01-01 VITALS — DIASTOLIC BLOOD PRESSURE: 75 MMHG | SYSTOLIC BLOOD PRESSURE: 125 MMHG

## 2021-01-01 VITALS — DIASTOLIC BLOOD PRESSURE: 70 MMHG | SYSTOLIC BLOOD PRESSURE: 99 MMHG

## 2021-01-01 VITALS — DIASTOLIC BLOOD PRESSURE: 73 MMHG

## 2021-01-01 VITALS — DIASTOLIC BLOOD PRESSURE: 67 MMHG | SYSTOLIC BLOOD PRESSURE: 108 MMHG

## 2021-01-01 VITALS — DIASTOLIC BLOOD PRESSURE: 64 MMHG

## 2021-01-01 VITALS — DIASTOLIC BLOOD PRESSURE: 69 MMHG

## 2021-01-01 VITALS — DIASTOLIC BLOOD PRESSURE: 52 MMHG | SYSTOLIC BLOOD PRESSURE: 69 MMHG

## 2021-01-01 VITALS — DIASTOLIC BLOOD PRESSURE: 80 MMHG | SYSTOLIC BLOOD PRESSURE: 115 MMHG

## 2021-01-01 VITALS — SYSTOLIC BLOOD PRESSURE: 129 MMHG | DIASTOLIC BLOOD PRESSURE: 80 MMHG

## 2021-01-01 VITALS — DIASTOLIC BLOOD PRESSURE: 77 MMHG

## 2021-01-01 VITALS — DIASTOLIC BLOOD PRESSURE: 81 MMHG | SYSTOLIC BLOOD PRESSURE: 134 MMHG

## 2021-01-01 VITALS — DIASTOLIC BLOOD PRESSURE: 55 MMHG

## 2021-01-01 VITALS — SYSTOLIC BLOOD PRESSURE: 108 MMHG | DIASTOLIC BLOOD PRESSURE: 71 MMHG

## 2021-01-01 VITALS — DIASTOLIC BLOOD PRESSURE: 67 MMHG | SYSTOLIC BLOOD PRESSURE: 102 MMHG

## 2021-01-01 VITALS — DIASTOLIC BLOOD PRESSURE: 63 MMHG

## 2021-01-01 VITALS — DIASTOLIC BLOOD PRESSURE: 94 MMHG

## 2021-01-01 VITALS — DIASTOLIC BLOOD PRESSURE: 79 MMHG | SYSTOLIC BLOOD PRESSURE: 136 MMHG

## 2021-01-01 VITALS — DIASTOLIC BLOOD PRESSURE: 76 MMHG | SYSTOLIC BLOOD PRESSURE: 115 MMHG

## 2021-01-01 VITALS — DIASTOLIC BLOOD PRESSURE: 72 MMHG | SYSTOLIC BLOOD PRESSURE: 118 MMHG

## 2021-01-01 VITALS — DIASTOLIC BLOOD PRESSURE: 65 MMHG

## 2021-01-01 VITALS — DIASTOLIC BLOOD PRESSURE: 57 MMHG

## 2021-01-01 VITALS — DIASTOLIC BLOOD PRESSURE: 70 MMHG

## 2021-01-01 VITALS — DIASTOLIC BLOOD PRESSURE: 64 MMHG | SYSTOLIC BLOOD PRESSURE: 114 MMHG

## 2021-01-01 VITALS — DIASTOLIC BLOOD PRESSURE: 61 MMHG

## 2021-01-01 VITALS — DIASTOLIC BLOOD PRESSURE: 67 MMHG | SYSTOLIC BLOOD PRESSURE: 110 MMHG

## 2021-01-01 VITALS — SYSTOLIC BLOOD PRESSURE: 107 MMHG | DIASTOLIC BLOOD PRESSURE: 71 MMHG

## 2021-01-01 VITALS — DIASTOLIC BLOOD PRESSURE: 62 MMHG

## 2021-01-01 VITALS — DIASTOLIC BLOOD PRESSURE: 51 MMHG

## 2021-01-01 VITALS — DIASTOLIC BLOOD PRESSURE: 75 MMHG

## 2021-01-01 VITALS — SYSTOLIC BLOOD PRESSURE: 109 MMHG | DIASTOLIC BLOOD PRESSURE: 66 MMHG

## 2021-01-01 VITALS — DIASTOLIC BLOOD PRESSURE: 60 MMHG | SYSTOLIC BLOOD PRESSURE: 77 MMHG

## 2021-01-01 VITALS — DIASTOLIC BLOOD PRESSURE: 74 MMHG

## 2021-01-01 VITALS — DIASTOLIC BLOOD PRESSURE: 67 MMHG

## 2021-01-01 VITALS — DIASTOLIC BLOOD PRESSURE: 78 MMHG

## 2021-01-01 LAB
ALBUMIN SERPL-MCNC: 2.4 GM/DL (ref 3.1–4.5)
ALP SERPL-CCNC: 55 U/L (ref 45–117)
ALT SERPL W P-5'-P-CCNC: 37 U/L (ref 12–78)
AST SERPL-CCNC: 45 IU/L (ref 3–35)
BASE EXCESS BLDA CALC-SCNC: 5.7 MMOL/L (ref -2–2)
BASE EXCESS BLDA CALC-SCNC: 6.5 MMOL/L (ref -2–2)
BASE EXCESS BLDA CALC-SCNC: 9.7 MMOL/L (ref -2–2)
BASOPHILS # BLD AUTO: 0 10*3/UL (ref 0–0.1)
BASOPHILS NFR BLD AUTO: 0.1 % (ref 0–1)
BUN SERPL-MCNC: 34 MG/DL (ref 7–24)
CHLORIDE SERPL-SCNC: 100 MMOL/L (ref 98–107)
CREAT SERPL-MCNC: 0.99 MG/DL (ref 0.7–1.3)
EOSINOPHIL # BLD AUTO: 0 10*3/UL (ref 0–0.4)
EOSINOPHIL # BLD AUTO: 0.3 % (ref 1–4)
ERYTHROCYTE [DISTWIDTH] IN BLOOD BY AUTOMATED COUNT: 14.7 % (ref 0–14.5)
HCT VFR BLD AUTO: 30.3 % (ref 42–52)
LDH SERPL-CCNC: 404 U/L (ref 87–241)
LYMPHOCYTES # BLD AUTO: 1.7 10*3/UL (ref 1.3–4.4)
LYMPHOCYTES NFR BLD AUTO: 14.9 % (ref 27–41)
MCH RBC QN AUTO: 27.2 PG (ref 27–31)
MCHC RBC AUTO-ENTMCNC: 28.7 G/DL (ref 33–37)
MCV RBC AUTO: 94.7 FL (ref 80–94)
MONOCYTES # BLD AUTO: 0.9 10*3/UL (ref 0.1–1)
MONOCYTES NFR BLD MANUAL: 7.6 % (ref 3–9)
NEUT #: 8.7 10*3/UL (ref 2.3–7.9)
NEUT %: 75.1 % (ref 47–73)
NRBC BLD QL AUTO: 0 10*3/UL (ref 0–0)
PCO2 BLDA: 62 MMHG (ref 35–45)
PCO2 BLDA: 64 MMHG (ref 35–45)
PCO2 BLDA: 74 MMHG (ref 35–45)
PH BLDA: 7.32 [PH] (ref 7.35–7.45)
PH BLDA: 7.32 [PH] (ref 7.35–7.45)
PH BLDA: 7.35 [PH] (ref 7.35–7.45)
PLATELET # BLD AUTO: 578 10*3/UL (ref 130–400)
PMV BLD AUTO: 11.4 FL (ref 9.6–12.3)
PO2 BLDA: 107 MMHG (ref 80–90)
PO2 BLDA: 81 MMHG (ref 80–90)
PO2 BLDA: 82 MMHG (ref 80–90)
POTASSIUM SERPL-SCNC: 4.5 MMOL/L (ref 3.5–5.1)
PROT SERPL-MCNC: 5.5 GM/DL (ref 6.4–8.2)
RBC # BLD AUTO: 3.2 10*6/UL (ref 4.5–5.9)
SAO2 % BLDA: 95 % (ref 95–97)
SAO2 % BLDA: 96 % (ref 95–97)
SAO2 % BLDA: 97 % (ref 95–97)
SODIUM SERPL-SCNC: 140 MMOL/L (ref 136–145)
WBC NRBC COR # BLD AUTO: 11.6 10*3/UL (ref 4.8–10.8)

## 2021-01-01 PROCEDURE — B548ZZA ULTRASONOGRAPHY OF SUPERIOR VENA CAVA, GUIDANCE: ICD-10-PCS | Performed by: NURSE ANESTHETIST, CERTIFIED REGISTERED

## 2021-01-01 PROCEDURE — 02HV33Z INSERTION OF INFUSION DEVICE INTO SUPERIOR VENA CAVA, PERCUTANEOUS APPROACH: ICD-10-PCS | Performed by: NURSE ANESTHETIST, CERTIFIED REGISTERED

## 2021-01-01 NOTE — NUR
PT. GIVEN TYLENOL FOR TEMP .1 AND HR ,  CURRENT 'S WITH TEMP
.8.  WILL CONTINUE TO MONITOR.
LISBETH CAREY RN

## 2021-01-01 NOTE — NUR
DR MOCTEZUMA UPDATED ON ADDITION OF 2ND AND 3RD PRESSOR, THE CHANGING OF CODE
STATUS TO DNR CC ARREST AND LATEST ABG'S. ORDERS RECEIVED.

## 2021-01-01 NOTE — NUR
ET ADVANCED TO 26 AT THE LIP PER DR. MOCTEZUMA' INSTRUCTIONS. PORTABLE CXR ORDERED
AFTER CHANGE DONE.

## 2021-01-01 NOTE — NUR
ON ASSESSMENT THIS AM PT ADEQUATELY SEDATED ON DIPRIVAN AT 20MCG/KG/MIN.
LEVOPHED AT 18MCG/MIN, 1/2 NS AT 75ML/HR. SEDATION VACATION HELD AND PT MADE
EYE CONTACT, NODDED AT ME. HIS DAUGHTER WAS CALLED AND CONSENT OBTAINED TO
INSERT NEW MLC. THIS WAS DONE BY APRIL PETERSON IN THE LEFT IJ. CXR WAS OBTAINED.
THE RSC MLC WAS REMOVED AND THE CATHETER CUT, USING STERILE TECHNIQUE AND
TAKEN TO THE LAB. BARRETT PATENT KITTY URINE. LEFT BRACHIAL ARTERIAL LINE WITH
GOOD WAVE FORM AND DYNAMIC RESPONSE. SEE ALL APPROPRIATE INTERVENTIONS.

## 2021-01-01 NOTE — NUR
2000 ISOLATION CONT. RESTING IN BED IN SUPINE POSITION. ET SECURE TO VENT.
PULSE OX 96%. LIJ INTACT. LB ART LINE INTACT. DIPRIVAN, DS LEVOPHED,
VASOPRESSIN AND EPI GTTS CONT. SEE INTERVENTION SCREEN FOR Q15MIN BP'S. EYES
OPEN TO NAME. WRISTY RESTRAINTS INTACT BILATERALLY. CIRCULATION ADEQUATE.
BARRETT PATENT AND DRAINING CLEAR YELLOW URINE.

## 2021-01-02 VITALS — DIASTOLIC BLOOD PRESSURE: 88 MMHG | SYSTOLIC BLOOD PRESSURE: 168 MMHG

## 2021-01-02 VITALS — DIASTOLIC BLOOD PRESSURE: 84 MMHG

## 2021-01-02 VITALS — DIASTOLIC BLOOD PRESSURE: 86 MMHG | SYSTOLIC BLOOD PRESSURE: 125 MMHG

## 2021-01-02 VITALS — SYSTOLIC BLOOD PRESSURE: 178 MMHG | DIASTOLIC BLOOD PRESSURE: 87 MMHG

## 2021-01-02 VITALS — SYSTOLIC BLOOD PRESSURE: 151 MMHG | DIASTOLIC BLOOD PRESSURE: 87 MMHG

## 2021-01-02 VITALS — SYSTOLIC BLOOD PRESSURE: 158 MMHG | DIASTOLIC BLOOD PRESSURE: 93 MMHG

## 2021-01-02 VITALS — DIASTOLIC BLOOD PRESSURE: 73 MMHG | SYSTOLIC BLOOD PRESSURE: 115 MMHG

## 2021-01-02 VITALS — DIASTOLIC BLOOD PRESSURE: 82 MMHG | SYSTOLIC BLOOD PRESSURE: 158 MMHG

## 2021-01-02 VITALS — SYSTOLIC BLOOD PRESSURE: 156 MMHG | DIASTOLIC BLOOD PRESSURE: 82 MMHG

## 2021-01-02 VITALS — SYSTOLIC BLOOD PRESSURE: 161 MMHG | DIASTOLIC BLOOD PRESSURE: 96 MMHG

## 2021-01-02 VITALS — DIASTOLIC BLOOD PRESSURE: 81 MMHG | SYSTOLIC BLOOD PRESSURE: 156 MMHG

## 2021-01-02 VITALS — SYSTOLIC BLOOD PRESSURE: 110 MMHG | DIASTOLIC BLOOD PRESSURE: 74 MMHG

## 2021-01-02 VITALS — SYSTOLIC BLOOD PRESSURE: 155 MMHG | DIASTOLIC BLOOD PRESSURE: 95 MMHG

## 2021-01-02 VITALS — DIASTOLIC BLOOD PRESSURE: 91 MMHG

## 2021-01-02 VITALS — DIASTOLIC BLOOD PRESSURE: 90 MMHG

## 2021-01-02 VITALS — SYSTOLIC BLOOD PRESSURE: 117 MMHG | DIASTOLIC BLOOD PRESSURE: 84 MMHG

## 2021-01-02 VITALS — DIASTOLIC BLOOD PRESSURE: 100 MMHG | SYSTOLIC BLOOD PRESSURE: 178 MMHG

## 2021-01-02 VITALS — DIASTOLIC BLOOD PRESSURE: 79 MMHG | SYSTOLIC BLOOD PRESSURE: 155 MMHG

## 2021-01-02 VITALS — DIASTOLIC BLOOD PRESSURE: 77 MMHG

## 2021-01-02 VITALS — DIASTOLIC BLOOD PRESSURE: 92 MMHG

## 2021-01-02 VITALS — DIASTOLIC BLOOD PRESSURE: 79 MMHG | SYSTOLIC BLOOD PRESSURE: 140 MMHG

## 2021-01-02 VITALS — DIASTOLIC BLOOD PRESSURE: 75 MMHG

## 2021-01-02 VITALS — DIASTOLIC BLOOD PRESSURE: 98 MMHG | SYSTOLIC BLOOD PRESSURE: 162 MMHG

## 2021-01-02 VITALS — DIASTOLIC BLOOD PRESSURE: 93 MMHG

## 2021-01-02 VITALS — SYSTOLIC BLOOD PRESSURE: 136 MMHG | DIASTOLIC BLOOD PRESSURE: 81 MMHG

## 2021-01-02 VITALS — DIASTOLIC BLOOD PRESSURE: 84 MMHG | SYSTOLIC BLOOD PRESSURE: 133 MMHG

## 2021-01-02 VITALS — DIASTOLIC BLOOD PRESSURE: 79 MMHG | SYSTOLIC BLOOD PRESSURE: 135 MMHG

## 2021-01-02 VITALS — SYSTOLIC BLOOD PRESSURE: 126 MMHG | DIASTOLIC BLOOD PRESSURE: 86 MMHG

## 2021-01-02 VITALS — DIASTOLIC BLOOD PRESSURE: 81 MMHG | SYSTOLIC BLOOD PRESSURE: 145 MMHG

## 2021-01-02 VITALS — SYSTOLIC BLOOD PRESSURE: 130 MMHG | DIASTOLIC BLOOD PRESSURE: 85 MMHG

## 2021-01-02 VITALS — DIASTOLIC BLOOD PRESSURE: 69 MMHG

## 2021-01-02 VITALS — DIASTOLIC BLOOD PRESSURE: 86 MMHG

## 2021-01-02 VITALS — SYSTOLIC BLOOD PRESSURE: 138 MMHG | DIASTOLIC BLOOD PRESSURE: 88 MMHG

## 2021-01-02 VITALS — DIASTOLIC BLOOD PRESSURE: 86 MMHG | SYSTOLIC BLOOD PRESSURE: 127 MMHG

## 2021-01-02 VITALS — SYSTOLIC BLOOD PRESSURE: 136 MMHG | DIASTOLIC BLOOD PRESSURE: 80 MMHG

## 2021-01-02 VITALS — DIASTOLIC BLOOD PRESSURE: 94 MMHG

## 2021-01-02 VITALS — DIASTOLIC BLOOD PRESSURE: 85 MMHG | SYSTOLIC BLOOD PRESSURE: 134 MMHG

## 2021-01-02 VITALS — DIASTOLIC BLOOD PRESSURE: 88 MMHG

## 2021-01-02 VITALS — DIASTOLIC BLOOD PRESSURE: 95 MMHG

## 2021-01-02 VITALS — DIASTOLIC BLOOD PRESSURE: 87 MMHG

## 2021-01-02 VITALS — DIASTOLIC BLOOD PRESSURE: 93 MMHG | SYSTOLIC BLOOD PRESSURE: 159 MMHG

## 2021-01-02 VITALS — DIASTOLIC BLOOD PRESSURE: 78 MMHG

## 2021-01-02 VITALS — SYSTOLIC BLOOD PRESSURE: 123 MMHG | DIASTOLIC BLOOD PRESSURE: 84 MMHG

## 2021-01-02 VITALS — SYSTOLIC BLOOD PRESSURE: 152 MMHG | DIASTOLIC BLOOD PRESSURE: 90 MMHG

## 2021-01-02 VITALS — SYSTOLIC BLOOD PRESSURE: 131 MMHG | DIASTOLIC BLOOD PRESSURE: 86 MMHG

## 2021-01-02 VITALS — DIASTOLIC BLOOD PRESSURE: 91 MMHG | SYSTOLIC BLOOD PRESSURE: 162 MMHG

## 2021-01-02 VITALS — DIASTOLIC BLOOD PRESSURE: 88 MMHG | SYSTOLIC BLOOD PRESSURE: 154 MMHG

## 2021-01-02 VITALS — DIASTOLIC BLOOD PRESSURE: 62 MMHG | SYSTOLIC BLOOD PRESSURE: 140 MMHG

## 2021-01-02 VITALS — DIASTOLIC BLOOD PRESSURE: 71 MMHG | SYSTOLIC BLOOD PRESSURE: 138 MMHG

## 2021-01-02 VITALS — DIASTOLIC BLOOD PRESSURE: 73 MMHG

## 2021-01-02 VITALS — DIASTOLIC BLOOD PRESSURE: 70 MMHG

## 2021-01-02 VITALS — DIASTOLIC BLOOD PRESSURE: 87 MMHG | SYSTOLIC BLOOD PRESSURE: 165 MMHG

## 2021-01-02 VITALS — DIASTOLIC BLOOD PRESSURE: 85 MMHG

## 2021-01-02 VITALS — DIASTOLIC BLOOD PRESSURE: 76 MMHG

## 2021-01-02 VITALS — SYSTOLIC BLOOD PRESSURE: 151 MMHG | DIASTOLIC BLOOD PRESSURE: 93 MMHG

## 2021-01-02 VITALS — DIASTOLIC BLOOD PRESSURE: 120 MMHG

## 2021-01-02 VITALS — SYSTOLIC BLOOD PRESSURE: 84 MMHG | DIASTOLIC BLOOD PRESSURE: 59 MMHG

## 2021-01-02 VITALS — DIASTOLIC BLOOD PRESSURE: 83 MMHG

## 2021-01-02 VITALS — DIASTOLIC BLOOD PRESSURE: 89 MMHG

## 2021-01-02 VITALS — DIASTOLIC BLOOD PRESSURE: 95 MMHG | SYSTOLIC BLOOD PRESSURE: 153 MMHG

## 2021-01-02 VITALS — DIASTOLIC BLOOD PRESSURE: 81 MMHG

## 2021-01-02 VITALS — SYSTOLIC BLOOD PRESSURE: 136 MMHG | DIASTOLIC BLOOD PRESSURE: 77 MMHG

## 2021-01-02 VITALS — SYSTOLIC BLOOD PRESSURE: 162 MMHG | DIASTOLIC BLOOD PRESSURE: 96 MMHG

## 2021-01-02 VITALS — DIASTOLIC BLOOD PRESSURE: 82 MMHG

## 2021-01-02 VITALS — DIASTOLIC BLOOD PRESSURE: 71 MMHG

## 2021-01-02 LAB
ALBUMIN SERPL-MCNC: 3.2 GM/DL (ref 3.1–4.5)
ALP SERPL-CCNC: 63 U/L (ref 45–117)
ALT SERPL W P-5'-P-CCNC: 45 U/L (ref 12–78)
AST SERPL-CCNC: 42 IU/L (ref 3–35)
BASE EXCESS BLDA CALC-SCNC: 8.4 MMOL/L (ref -2–2)
BASE EXCESS BLDA CALC-SCNC: 8.8 MMOL/L (ref -2–2)
BASE EXCESS BLDA CALC-SCNC: 9.1 MMOL/L (ref -2–2)
BASE EXCESS BLDA CALC-SCNC: 9.4 MMOL/L (ref -2–2)
BASOPHILS # BLD AUTO: 0 10*3/UL (ref 0–0.1)
BASOPHILS NFR BLD AUTO: 0.2 % (ref 0–1)
BUN SERPL-MCNC: 41 MG/DL (ref 7–24)
CHLORIDE SERPL-SCNC: 96 MMOL/L (ref 98–107)
CREAT SERPL-MCNC: 1.14 MG/DL (ref 0.7–1.3)
EOSINOPHIL # BLD AUTO: 0 % (ref 1–4)
EOSINOPHIL # BLD AUTO: 0 10*3/UL (ref 0–0.4)
ERYTHROCYTE [DISTWIDTH] IN BLOOD BY AUTOMATED COUNT: 15 % (ref 0–14.5)
HCT VFR BLD AUTO: 26.4 % (ref 42–52)
LDH SERPL-CCNC: 474 U/L (ref 87–241)
LYMPHOCYTES # BLD AUTO: 1.6 10*3/UL (ref 1.3–4.4)
LYMPHOCYTES NFR BLD AUTO: 9.3 % (ref 27–41)
MCH RBC QN AUTO: 27.3 PG (ref 27–31)
MCHC RBC AUTO-ENTMCNC: 29.9 G/DL (ref 33–37)
MCV RBC AUTO: 91.3 FL (ref 80–94)
MONOCYTES # BLD AUTO: 1.3 10*3/UL (ref 0.1–1)
MONOCYTES NFR BLD MANUAL: 7.5 % (ref 3–9)
NEUT #: 14.3 10*3/UL (ref 2.3–7.9)
NEUT %: 81.5 % (ref 47–73)
NRBC BLD QL AUTO: 0.2 % (ref 0–0)
PCO2 BLDA: 60 MMHG (ref 35–45)
PCO2 BLDA: 61 MMHG (ref 35–45)
PCO2 BLDA: 62 MMHG (ref 35–45)
PCO2 BLDA: 88 MMHG (ref 35–45)
PH BLDA: 7.25 [PH] (ref 7.35–7.45)
PH BLDA: 7.37 [PH] (ref 7.35–7.45)
PH BLDA: 7.38 [PH] (ref 7.35–7.45)
PH BLDA: 7.39 [PH] (ref 7.35–7.45)
PLATELET # BLD AUTO: 579 10*3/UL (ref 130–400)
PMV BLD AUTO: 11.4 FL (ref 9.6–12.3)
PO2 BLDA: 110 MMHG (ref 80–90)
PO2 BLDA: 120 MMHG (ref 80–90)
PO2 BLDA: 138 MMHG (ref 80–90)
PO2 BLDA: 152 MMHG (ref 80–90)
POTASSIUM SERPL-SCNC: 4.3 MMOL/L (ref 3.5–5.1)
PROT SERPL-MCNC: 5.9 GM/DL (ref 6.4–8.2)
RBC # BLD AUTO: 2.89 10*6/UL (ref 4.5–5.9)
SAO2 % BLDA: 98 % (ref 95–97)
SODIUM SERPL-SCNC: 136 MMOL/L (ref 136–145)
WBC NRBC COR # BLD AUTO: 17.5 10*3/UL (ref 4.8–10.8)

## 2021-01-02 PROCEDURE — 0BC68ZZ EXTIRPATION OF MATTER FROM RIGHT LOWER LOBE BRONCHUS, VIA NATURAL OR ARTIFICIAL OPENING ENDOSCOPIC: ICD-10-PCS | Performed by: INTERNAL MEDICINE

## 2021-01-02 PROCEDURE — 0BC58ZZ EXTIRPATION OF MATTER FROM RIGHT MIDDLE LOBE BRONCHUS, VIA NATURAL OR ARTIFICIAL OPENING ENDOSCOPIC: ICD-10-PCS | Performed by: INTERNAL MEDICINE

## 2021-01-02 PROCEDURE — 0BC98ZZ EXTIRPATION OF MATTER FROM LINGULA BRONCHUS, VIA NATURAL OR ARTIFICIAL OPENING ENDOSCOPIC: ICD-10-PCS | Performed by: INTERNAL MEDICINE

## 2021-01-02 PROCEDURE — 0BC38ZZ EXTIRPATION OF MATTER FROM RIGHT MAIN BRONCHUS, VIA NATURAL OR ARTIFICIAL OPENING ENDOSCOPIC: ICD-10-PCS | Performed by: INTERNAL MEDICINE

## 2021-01-02 PROCEDURE — 0BC18ZZ EXTIRPATION OF MATTER FROM TRACHEA, VIA NATURAL OR ARTIFICIAL OPENING ENDOSCOPIC: ICD-10-PCS | Performed by: INTERNAL MEDICINE

## 2021-01-02 PROCEDURE — 0BC88ZZ EXTIRPATION OF MATTER FROM LEFT UPPER LOBE BRONCHUS, VIA NATURAL OR ARTIFICIAL OPENING ENDOSCOPIC: ICD-10-PCS | Performed by: INTERNAL MEDICINE

## 2021-01-02 PROCEDURE — 0BC48ZZ EXTIRPATION OF MATTER FROM RIGHT UPPER LOBE BRONCHUS, VIA NATURAL OR ARTIFICIAL OPENING ENDOSCOPIC: ICD-10-PCS | Performed by: INTERNAL MEDICINE

## 2021-01-02 PROCEDURE — 0BC78ZZ EXTIRPATION OF MATTER FROM LEFT MAIN BRONCHUS, VIA NATURAL OR ARTIFICIAL OPENING ENDOSCOPIC: ICD-10-PCS | Performed by: INTERNAL MEDICINE

## 2021-01-02 PROCEDURE — 0BCB8ZZ EXTIRPATION OF MATTER FROM LEFT LOWER LOBE BRONCHUS, VIA NATURAL OR ARTIFICIAL OPENING ENDOSCOPIC: ICD-10-PCS | Performed by: INTERNAL MEDICINE

## 2021-01-02 NOTE — NUR
REPOSITIONED ON BACK. HOB ELEVATED. ET SECURE TO VENT. PULSE OX 98%. ALL GTTS
CONT. Q15MIN BP'S CONT. HR AND BP REMAIN VERY LABILE. ALL PRESSORS CONT.
CONDITION SERIOUS.

## 2021-01-02 NOTE — NUR
0000 LATE ENTRY. AFTER EARLIER ADVANCEMENT OF ET, CXR SHOWS THAT ET NEEDS
ADVANCED ANOTHER 4CM, TO 30 CM. MARKINGS ON ET ONLY GO TO 29CM. PT IS GETTING
VOLUMES ON VENT AND VENT IS NOT ALARMING. WILL NOTIFY AM STAFF. RESPIRATORY
THERAPIST IS AWARE AND VIEWED MARKING ON ET WITH THIS RN. WILL CONT TO
MONITOR.

## 2021-01-02 NOTE — NUR
VERSED GIVEN PER VERBAL FROM DR MOCTEZUMA AFTER ETT ADVANCED AND PATIENT STILL NOT
GETTING PRESSURES ON ASSIST CONTROL - BEDSIDE BRONCH AFTER CONSENT FROM FAMILY

## 2021-01-02 NOTE — NUR
AFTER BRONCH PT HR UP 'S, BUT RESOLVED WITHOUT INTERVENTION
PT ALSO BECOME TACHYCARDIC WITH AND NURSING CARE, AND BP WILL DROP WITH THE
TACHYCARDIA, BOTH SELF RESOLVE WITHOUT INTERVENTIONS WHEN PT IS LEFT ALONE

## 2021-01-03 VITALS — SYSTOLIC BLOOD PRESSURE: 156 MMHG | DIASTOLIC BLOOD PRESSURE: 90 MMHG

## 2021-01-03 VITALS — DIASTOLIC BLOOD PRESSURE: 60 MMHG | SYSTOLIC BLOOD PRESSURE: 86 MMHG

## 2021-01-03 VITALS — DIASTOLIC BLOOD PRESSURE: 91 MMHG

## 2021-01-03 VITALS — SYSTOLIC BLOOD PRESSURE: 156 MMHG | DIASTOLIC BLOOD PRESSURE: 96 MMHG

## 2021-01-03 VITALS — SYSTOLIC BLOOD PRESSURE: 137 MMHG | DIASTOLIC BLOOD PRESSURE: 87 MMHG

## 2021-01-03 VITALS — DIASTOLIC BLOOD PRESSURE: 84 MMHG | SYSTOLIC BLOOD PRESSURE: 135 MMHG

## 2021-01-03 VITALS — SYSTOLIC BLOOD PRESSURE: 155 MMHG | DIASTOLIC BLOOD PRESSURE: 93 MMHG

## 2021-01-03 VITALS — SYSTOLIC BLOOD PRESSURE: 88 MMHG | DIASTOLIC BLOOD PRESSURE: 63 MMHG

## 2021-01-03 VITALS — SYSTOLIC BLOOD PRESSURE: 98 MMHG | DIASTOLIC BLOOD PRESSURE: 68 MMHG

## 2021-01-03 VITALS — DIASTOLIC BLOOD PRESSURE: 89 MMHG | SYSTOLIC BLOOD PRESSURE: 147 MMHG

## 2021-01-03 VITALS — DIASTOLIC BLOOD PRESSURE: 77 MMHG | SYSTOLIC BLOOD PRESSURE: 115 MMHG

## 2021-01-03 VITALS — DIASTOLIC BLOOD PRESSURE: 63 MMHG | SYSTOLIC BLOOD PRESSURE: 87 MMHG

## 2021-01-03 VITALS — DIASTOLIC BLOOD PRESSURE: 76 MMHG | SYSTOLIC BLOOD PRESSURE: 112 MMHG

## 2021-01-03 VITALS — DIASTOLIC BLOOD PRESSURE: 86 MMHG | SYSTOLIC BLOOD PRESSURE: 146 MMHG

## 2021-01-03 VITALS — DIASTOLIC BLOOD PRESSURE: 65 MMHG

## 2021-01-03 VITALS — DIASTOLIC BLOOD PRESSURE: 88 MMHG | SYSTOLIC BLOOD PRESSURE: 142 MMHG

## 2021-01-03 VITALS — SYSTOLIC BLOOD PRESSURE: 97 MMHG | DIASTOLIC BLOOD PRESSURE: 65 MMHG

## 2021-01-03 VITALS — DIASTOLIC BLOOD PRESSURE: 79 MMHG | SYSTOLIC BLOOD PRESSURE: 120 MMHG

## 2021-01-03 VITALS — SYSTOLIC BLOOD PRESSURE: 165 MMHG | DIASTOLIC BLOOD PRESSURE: 93 MMHG

## 2021-01-03 VITALS — SYSTOLIC BLOOD PRESSURE: 133 MMHG | DIASTOLIC BLOOD PRESSURE: 69 MMHG

## 2021-01-03 VITALS — SYSTOLIC BLOOD PRESSURE: 100 MMHG | DIASTOLIC BLOOD PRESSURE: 70 MMHG

## 2021-01-03 VITALS — DIASTOLIC BLOOD PRESSURE: 106 MMHG

## 2021-01-03 VITALS — SYSTOLIC BLOOD PRESSURE: 133 MMHG | DIASTOLIC BLOOD PRESSURE: 84 MMHG

## 2021-01-03 VITALS — DIASTOLIC BLOOD PRESSURE: 77 MMHG

## 2021-01-03 VITALS — SYSTOLIC BLOOD PRESSURE: 177 MMHG | DIASTOLIC BLOOD PRESSURE: 101 MMHG

## 2021-01-03 VITALS — SYSTOLIC BLOOD PRESSURE: 206 MMHG | DIASTOLIC BLOOD PRESSURE: 118 MMHG

## 2021-01-03 VITALS — DIASTOLIC BLOOD PRESSURE: 67 MMHG

## 2021-01-03 VITALS — DIASTOLIC BLOOD PRESSURE: 89 MMHG | SYSTOLIC BLOOD PRESSURE: 139 MMHG

## 2021-01-03 VITALS — DIASTOLIC BLOOD PRESSURE: 557 MMHG

## 2021-01-03 VITALS — DIASTOLIC BLOOD PRESSURE: 93 MMHG

## 2021-01-03 VITALS — DIASTOLIC BLOOD PRESSURE: 92 MMHG

## 2021-01-03 VITALS — DIASTOLIC BLOOD PRESSURE: 85 MMHG

## 2021-01-03 VITALS — DIASTOLIC BLOOD PRESSURE: 92 MMHG | SYSTOLIC BLOOD PRESSURE: 164 MMHG

## 2021-01-03 VITALS — DIASTOLIC BLOOD PRESSURE: 83 MMHG

## 2021-01-03 VITALS — SYSTOLIC BLOOD PRESSURE: 97 MMHG | DIASTOLIC BLOOD PRESSURE: 67 MMHG

## 2021-01-03 VITALS — DIASTOLIC BLOOD PRESSURE: 95 MMHG | SYSTOLIC BLOOD PRESSURE: 157 MMHG

## 2021-01-03 VITALS — SYSTOLIC BLOOD PRESSURE: 113 MMHG | DIASTOLIC BLOOD PRESSURE: 76 MMHG

## 2021-01-03 VITALS — SYSTOLIC BLOOD PRESSURE: 120 MMHG | DIASTOLIC BLOOD PRESSURE: 82 MMHG

## 2021-01-03 VITALS — DIASTOLIC BLOOD PRESSURE: 79 MMHG

## 2021-01-03 VITALS — DIASTOLIC BLOOD PRESSURE: 72 MMHG | SYSTOLIC BLOOD PRESSURE: 109 MMHG

## 2021-01-03 VITALS — SYSTOLIC BLOOD PRESSURE: 129 MMHG | DIASTOLIC BLOOD PRESSURE: 84 MMHG

## 2021-01-03 VITALS — DIASTOLIC BLOOD PRESSURE: 84 MMHG | SYSTOLIC BLOOD PRESSURE: 113 MMHG

## 2021-01-03 VITALS — SYSTOLIC BLOOD PRESSURE: 218 MMHG | DIASTOLIC BLOOD PRESSURE: 112 MMHG

## 2021-01-03 VITALS — SYSTOLIC BLOOD PRESSURE: 133 MMHG | DIASTOLIC BLOOD PRESSURE: 88 MMHG

## 2021-01-03 VITALS — DIASTOLIC BLOOD PRESSURE: 80 MMHG

## 2021-01-03 VITALS — DIASTOLIC BLOOD PRESSURE: 91 MMHG | SYSTOLIC BLOOD PRESSURE: 153 MMHG

## 2021-01-03 VITALS — DIASTOLIC BLOOD PRESSURE: 89 MMHG | SYSTOLIC BLOOD PRESSURE: 136 MMHG

## 2021-01-03 VITALS — DIASTOLIC BLOOD PRESSURE: 88 MMHG

## 2021-01-03 VITALS — DIASTOLIC BLOOD PRESSURE: 66 MMHG

## 2021-01-03 VITALS — DIASTOLIC BLOOD PRESSURE: 84 MMHG

## 2021-01-03 VITALS — DIASTOLIC BLOOD PRESSURE: 62 MMHG

## 2021-01-03 VITALS — DIASTOLIC BLOOD PRESSURE: 87 MMHG

## 2021-01-03 VITALS — DIASTOLIC BLOOD PRESSURE: 98 MMHG

## 2021-01-03 VITALS — DIASTOLIC BLOOD PRESSURE: 76 MMHG

## 2021-01-03 VITALS — SYSTOLIC BLOOD PRESSURE: 124 MMHG | DIASTOLIC BLOOD PRESSURE: 82 MMHG

## 2021-01-03 VITALS — DIASTOLIC BLOOD PRESSURE: 97 MMHG

## 2021-01-03 VITALS — DIASTOLIC BLOOD PRESSURE: 67 MMHG | SYSTOLIC BLOOD PRESSURE: 98 MMHG

## 2021-01-03 VITALS — DIASTOLIC BLOOD PRESSURE: 60 MMHG

## 2021-01-03 VITALS — SYSTOLIC BLOOD PRESSURE: 109 MMHG | DIASTOLIC BLOOD PRESSURE: 72 MMHG

## 2021-01-03 VITALS — SYSTOLIC BLOOD PRESSURE: 82 MMHG | DIASTOLIC BLOOD PRESSURE: 57 MMHG

## 2021-01-03 VITALS — SYSTOLIC BLOOD PRESSURE: 95 MMHG | DIASTOLIC BLOOD PRESSURE: 68 MMHG

## 2021-01-03 VITALS — DIASTOLIC BLOOD PRESSURE: 68 MMHG

## 2021-01-03 VITALS — DIASTOLIC BLOOD PRESSURE: 91 MMHG | SYSTOLIC BLOOD PRESSURE: 142 MMHG

## 2021-01-03 VITALS — DIASTOLIC BLOOD PRESSURE: 82 MMHG

## 2021-01-03 VITALS — SYSTOLIC BLOOD PRESSURE: 103 MMHG | DIASTOLIC BLOOD PRESSURE: 69 MMHG

## 2021-01-03 VITALS — SYSTOLIC BLOOD PRESSURE: 158 MMHG | DIASTOLIC BLOOD PRESSURE: 92 MMHG

## 2021-01-03 VITALS — DIASTOLIC BLOOD PRESSURE: 90 MMHG | SYSTOLIC BLOOD PRESSURE: 153 MMHG

## 2021-01-03 VITALS — DIASTOLIC BLOOD PRESSURE: 81 MMHG | SYSTOLIC BLOOD PRESSURE: 126 MMHG

## 2021-01-03 VITALS — DIASTOLIC BLOOD PRESSURE: 88 MMHG | SYSTOLIC BLOOD PRESSURE: 147 MMHG

## 2021-01-03 VITALS — DIASTOLIC BLOOD PRESSURE: 53 MMHG

## 2021-01-03 VITALS — SYSTOLIC BLOOD PRESSURE: 114 MMHG | DIASTOLIC BLOOD PRESSURE: 75 MMHG

## 2021-01-03 VITALS — DIASTOLIC BLOOD PRESSURE: 89 MMHG

## 2021-01-03 LAB
ALBUMIN SERPL-MCNC: 3.4 GM/DL (ref 3.1–4.5)
ALP SERPL-CCNC: 60 U/L (ref 45–117)
ALT SERPL W P-5'-P-CCNC: 42 U/L (ref 12–78)
AST SERPL-CCNC: 35 IU/L (ref 3–35)
BASE EXCESS BLDA CALC-SCNC: 11.1 MMOL/L (ref -2–2)
BASE EXCESS BLDA CALC-SCNC: 11.7 MMOL/L (ref -2–2)
BASE EXCESS BLDA CALC-SCNC: 13.1 MMOL/L (ref -2–2)
BASOPHILS # BLD AUTO: 0 10*3/UL (ref 0–0.1)
BASOPHILS NFR BLD AUTO: 0.1 % (ref 0–1)
BUN SERPL-MCNC: 36 MG/DL (ref 7–24)
CHLORIDE SERPL-SCNC: 93 MMOL/L (ref 98–107)
CREAT SERPL-MCNC: 0.9 MG/DL (ref 0.7–1.3)
EOSINOPHIL # BLD AUTO: 0 % (ref 1–4)
EOSINOPHIL # BLD AUTO: 0 10*3/UL (ref 0–0.4)
ERYTHROCYTE [DISTWIDTH] IN BLOOD BY AUTOMATED COUNT: 15.3 % (ref 0–14.5)
HCT VFR BLD AUTO: 24.5 % (ref 42–52)
LDH SERPL-CCNC: 371 U/L (ref 87–241)
LYMPHOCYTES # BLD AUTO: 1.4 10*3/UL (ref 1.3–4.4)
LYMPHOCYTES NFR BLD AUTO: 6.6 % (ref 27–41)
MCH RBC QN AUTO: 27.6 PG (ref 27–31)
MCHC RBC AUTO-ENTMCNC: 30.2 G/DL (ref 33–37)
MCV RBC AUTO: 91.4 FL (ref 80–94)
MONOCYTES # BLD AUTO: 1.3 10*3/UL (ref 0.1–1)
MONOCYTES NFR BLD MANUAL: 6.3 % (ref 3–9)
NEUT #: 18 10*3/UL (ref 2.3–7.9)
NEUT %: 86.1 % (ref 47–73)
NRBC BLD QL AUTO: 0 10*3/UL (ref 0–0)
PCO2 BLDA: 55 MMHG (ref 35–45)
PCO2 BLDA: 58 MMHG (ref 35–45)
PCO2 BLDA: 62 MMHG (ref 35–45)
PH BLDA: 7.39 [PH] (ref 7.35–7.45)
PH BLDA: 7.44 [PH] (ref 7.35–7.45)
PH BLDA: 7.44 [PH] (ref 7.35–7.45)
PLATELET # BLD AUTO: 483 10*3/UL (ref 130–400)
PMV BLD AUTO: 11.3 FL (ref 9.6–12.3)
PO2 BLDA: 105 MMHG (ref 80–90)
PO2 BLDA: 125 MMHG (ref 80–90)
PO2 BLDA: 186 MMHG (ref 80–90)
POTASSIUM SERPL-SCNC: 3.3 MMOL/L (ref 3.5–5.1)
PROT SERPL-MCNC: 6.1 GM/DL (ref 6.4–8.2)
RBC # BLD AUTO: 2.68 10*6/UL (ref 4.5–5.9)
SAO2 % BLDA: 98 % (ref 95–97)
SAO2 % BLDA: 98 % (ref 95–97)
SAO2 % BLDA: 99 % (ref 95–97)
SODIUM SERPL-SCNC: 135 MMOL/L (ref 136–145)
SPECIMEN PREPARATION: (no result)
WBC NRBC COR # BLD AUTO: 21 10*3/UL (ref 4.8–10.8)

## 2021-01-03 NOTE — NUR
PT GETS EXTREMELY AGITATED WITH TURN AND REPOSITIONED, GETS TACHYCARDIC AND
HYPOTENSIVE, SETTLES ON OWN AFTER A FEW MINUTES

## 2021-01-04 VITALS — DIASTOLIC BLOOD PRESSURE: 66 MMHG | SYSTOLIC BLOOD PRESSURE: 90 MMHG

## 2021-01-04 VITALS — DIASTOLIC BLOOD PRESSURE: 76 MMHG | SYSTOLIC BLOOD PRESSURE: 156 MMHG

## 2021-01-04 VITALS — DIASTOLIC BLOOD PRESSURE: 65 MMHG | SYSTOLIC BLOOD PRESSURE: 88 MMHG

## 2021-01-04 VITALS — DIASTOLIC BLOOD PRESSURE: 69 MMHG

## 2021-01-04 VITALS — SYSTOLIC BLOOD PRESSURE: 83 MMHG | DIASTOLIC BLOOD PRESSURE: 63 MMHG

## 2021-01-04 VITALS — DIASTOLIC BLOOD PRESSURE: 57 MMHG | SYSTOLIC BLOOD PRESSURE: 98 MMHG

## 2021-01-04 VITALS — DIASTOLIC BLOOD PRESSURE: 75 MMHG

## 2021-01-04 VITALS — SYSTOLIC BLOOD PRESSURE: 157 MMHG | DIASTOLIC BLOOD PRESSURE: 80 MMHG

## 2021-01-04 VITALS — DIASTOLIC BLOOD PRESSURE: 87 MMHG | SYSTOLIC BLOOD PRESSURE: 171 MMHG

## 2021-01-04 VITALS — SYSTOLIC BLOOD PRESSURE: 91 MMHG | DIASTOLIC BLOOD PRESSURE: 67 MMHG

## 2021-01-04 VITALS — SYSTOLIC BLOOD PRESSURE: 163 MMHG | DIASTOLIC BLOOD PRESSURE: 79 MMHG

## 2021-01-04 VITALS — SYSTOLIC BLOOD PRESSURE: 107 MMHG | DIASTOLIC BLOOD PRESSURE: 59 MMHG

## 2021-01-04 VITALS — DIASTOLIC BLOOD PRESSURE: 76 MMHG

## 2021-01-04 VITALS — SYSTOLIC BLOOD PRESSURE: 97 MMHG | DIASTOLIC BLOOD PRESSURE: 69 MMHG

## 2021-01-04 VITALS — SYSTOLIC BLOOD PRESSURE: 158 MMHG | DIASTOLIC BLOOD PRESSURE: 78 MMHG

## 2021-01-04 VITALS — DIASTOLIC BLOOD PRESSURE: 72 MMHG | SYSTOLIC BLOOD PRESSURE: 98 MMHG

## 2021-01-04 VITALS — DIASTOLIC BLOOD PRESSURE: 67 MMHG

## 2021-01-04 VITALS — DIASTOLIC BLOOD PRESSURE: 66 MMHG

## 2021-01-04 VITALS — SYSTOLIC BLOOD PRESSURE: 100 MMHG | DIASTOLIC BLOOD PRESSURE: 74 MMHG

## 2021-01-04 VITALS — SYSTOLIC BLOOD PRESSURE: 155 MMHG | DIASTOLIC BLOOD PRESSURE: 77 MMHG

## 2021-01-04 VITALS — SYSTOLIC BLOOD PRESSURE: 124 MMHG | DIASTOLIC BLOOD PRESSURE: 61 MMHG

## 2021-01-04 VITALS — DIASTOLIC BLOOD PRESSURE: 72 MMHG

## 2021-01-04 VITALS — DIASTOLIC BLOOD PRESSURE: 60 MMHG | SYSTOLIC BLOOD PRESSURE: 113 MMHG

## 2021-01-04 VITALS — DIASTOLIC BLOOD PRESSURE: 78 MMHG

## 2021-01-04 VITALS — DIASTOLIC BLOOD PRESSURE: 66 MMHG | SYSTOLIC BLOOD PRESSURE: 91 MMHG

## 2021-01-04 VITALS — DIASTOLIC BLOOD PRESSURE: 64 MMHG | SYSTOLIC BLOOD PRESSURE: 121 MMHG

## 2021-01-04 VITALS — SYSTOLIC BLOOD PRESSURE: 90 MMHG | DIASTOLIC BLOOD PRESSURE: 66 MMHG

## 2021-01-04 VITALS — DIASTOLIC BLOOD PRESSURE: 63 MMHG | SYSTOLIC BLOOD PRESSURE: 87 MMHG

## 2021-01-04 VITALS — SYSTOLIC BLOOD PRESSURE: 91 MMHG | DIASTOLIC BLOOD PRESSURE: 69 MMHG

## 2021-01-04 VITALS — DIASTOLIC BLOOD PRESSURE: 71 MMHG | SYSTOLIC BLOOD PRESSURE: 96 MMHG

## 2021-01-04 VITALS — DIASTOLIC BLOOD PRESSURE: 84 MMHG | SYSTOLIC BLOOD PRESSURE: 124 MMHG

## 2021-01-04 VITALS — DIASTOLIC BLOOD PRESSURE: 68 MMHG | SYSTOLIC BLOOD PRESSURE: 98 MMHG

## 2021-01-04 VITALS — SYSTOLIC BLOOD PRESSURE: 88 MMHG | DIASTOLIC BLOOD PRESSURE: 64 MMHG

## 2021-01-04 VITALS — SYSTOLIC BLOOD PRESSURE: 157 MMHG | DIASTOLIC BLOOD PRESSURE: 79 MMHG

## 2021-01-04 VITALS — DIASTOLIC BLOOD PRESSURE: 60 MMHG

## 2021-01-04 VITALS — SYSTOLIC BLOOD PRESSURE: 125 MMHG | DIASTOLIC BLOOD PRESSURE: 64 MMHG

## 2021-01-04 VITALS — DIASTOLIC BLOOD PRESSURE: 84 MMHG

## 2021-01-04 VITALS — DIASTOLIC BLOOD PRESSURE: 70 MMHG | SYSTOLIC BLOOD PRESSURE: 99 MMHG

## 2021-01-04 VITALS — DIASTOLIC BLOOD PRESSURE: 74 MMHG | SYSTOLIC BLOOD PRESSURE: 113 MMHG

## 2021-01-04 VITALS — DIASTOLIC BLOOD PRESSURE: 65 MMHG

## 2021-01-04 VITALS — SYSTOLIC BLOOD PRESSURE: 91 MMHG | DIASTOLIC BLOOD PRESSURE: 66 MMHG

## 2021-01-04 VITALS — DIASTOLIC BLOOD PRESSURE: 57 MMHG

## 2021-01-04 VITALS — DIASTOLIC BLOOD PRESSURE: 58 MMHG

## 2021-01-04 VITALS — SYSTOLIC BLOOD PRESSURE: 104 MMHG | DIASTOLIC BLOOD PRESSURE: 74 MMHG

## 2021-01-04 VITALS — DIASTOLIC BLOOD PRESSURE: 93 MMHG

## 2021-01-04 VITALS — DIASTOLIC BLOOD PRESSURE: 70 MMHG | SYSTOLIC BLOOD PRESSURE: 96 MMHG

## 2021-01-04 VITALS — SYSTOLIC BLOOD PRESSURE: 158 MMHG | DIASTOLIC BLOOD PRESSURE: 76 MMHG

## 2021-01-04 VITALS — DIASTOLIC BLOOD PRESSURE: 59 MMHG

## 2021-01-04 VITALS — DIASTOLIC BLOOD PRESSURE: 74 MMHG | SYSTOLIC BLOOD PRESSURE: 135 MMHG

## 2021-01-04 VITALS — SYSTOLIC BLOOD PRESSURE: 121 MMHG | DIASTOLIC BLOOD PRESSURE: 84 MMHG

## 2021-01-04 VITALS — SYSTOLIC BLOOD PRESSURE: 101 MMHG | DIASTOLIC BLOOD PRESSURE: 76 MMHG

## 2021-01-04 VITALS — SYSTOLIC BLOOD PRESSURE: 112 MMHG | DIASTOLIC BLOOD PRESSURE: 62 MMHG

## 2021-01-04 VITALS — DIASTOLIC BLOOD PRESSURE: 70 MMHG

## 2021-01-04 VITALS — DIASTOLIC BLOOD PRESSURE: 54 MMHG | SYSTOLIC BLOOD PRESSURE: 90 MMHG

## 2021-01-04 VITALS — DIASTOLIC BLOOD PRESSURE: 79 MMHG

## 2021-01-04 VITALS — DIASTOLIC BLOOD PRESSURE: 71 MMHG

## 2021-01-04 VITALS — DIASTOLIC BLOOD PRESSURE: 65 MMHG | SYSTOLIC BLOOD PRESSURE: 130 MMHG

## 2021-01-04 VITALS — DIASTOLIC BLOOD PRESSURE: 83 MMHG

## 2021-01-04 VITALS — DIASTOLIC BLOOD PRESSURE: 64 MMHG

## 2021-01-04 VITALS — DIASTOLIC BLOOD PRESSURE: 61 MMHG

## 2021-01-04 VITALS — DIASTOLIC BLOOD PRESSURE: 63 MMHG

## 2021-01-04 VITALS — DIASTOLIC BLOOD PRESSURE: 66 MMHG | SYSTOLIC BLOOD PRESSURE: 146 MMHG

## 2021-01-04 VITALS — SYSTOLIC BLOOD PRESSURE: 102 MMHG | DIASTOLIC BLOOD PRESSURE: 58 MMHG

## 2021-01-04 VITALS — DIASTOLIC BLOOD PRESSURE: 74 MMHG

## 2021-01-04 VITALS — DIASTOLIC BLOOD PRESSURE: 90 MMHG

## 2021-01-04 VITALS — SYSTOLIC BLOOD PRESSURE: 100 MMHG | DIASTOLIC BLOOD PRESSURE: 57 MMHG

## 2021-01-04 VITALS — DIASTOLIC BLOOD PRESSURE: 77 MMHG | SYSTOLIC BLOOD PRESSURE: 172 MMHG

## 2021-01-04 VITALS — SYSTOLIC BLOOD PRESSURE: 113 MMHG | DIASTOLIC BLOOD PRESSURE: 61 MMHG

## 2021-01-04 VITALS — DIASTOLIC BLOOD PRESSURE: 62 MMHG | SYSTOLIC BLOOD PRESSURE: 120 MMHG

## 2021-01-04 VITALS — SYSTOLIC BLOOD PRESSURE: 86 MMHG | DIASTOLIC BLOOD PRESSURE: 63 MMHG

## 2021-01-04 VITALS — DIASTOLIC BLOOD PRESSURE: 52 MMHG

## 2021-01-04 VITALS — DIASTOLIC BLOOD PRESSURE: 77 MMHG

## 2021-01-04 VITALS — DIASTOLIC BLOOD PRESSURE: 68 MMHG

## 2021-01-04 VITALS — DIASTOLIC BLOOD PRESSURE: 62 MMHG

## 2021-01-04 LAB
ALBUMIN SERPL-MCNC: 3.5 GM/DL (ref 3.1–4.5)
ALP SERPL-CCNC: 54 U/L (ref 45–117)
ALT SERPL W P-5'-P-CCNC: 45 U/L (ref 12–78)
AST SERPL-CCNC: 40 IU/L (ref 3–35)
BASE EXCESS BLDA CALC-SCNC: 12.3 MMOL/L (ref -2–2)
BASE EXCESS BLDA CALC-SCNC: 12.7 MMOL/L (ref -2–2)
BASE EXCESS BLDA CALC-SCNC: 13.1 MMOL/L (ref -2–2)
BASE EXCESS BLDA CALC-SCNC: 16.2 MMOL/L (ref -2–2)
BASE EXCESS BLDA CALC-SCNC: 19.8 MMOL/L (ref -2–2)
BASOPHILS # BLD AUTO: 0 10*3/UL (ref 0–0.1)
BASOPHILS NFR BLD AUTO: 0.1 % (ref 0–1)
BUN SERPL-MCNC: 41 MG/DL (ref 7–24)
CHLORIDE SERPL-SCNC: 91 MMOL/L (ref 98–107)
CREAT SERPL-MCNC: 0.93 MG/DL (ref 0.7–1.3)
EOSINOPHIL # BLD AUTO: 0 % (ref 1–4)
EOSINOPHIL # BLD AUTO: 0 10*3/UL (ref 0–0.4)
ERYTHROCYTE [DISTWIDTH] IN BLOOD BY AUTOMATED COUNT: 15.5 % (ref 0–14.5)
HCT VFR BLD AUTO: 23.9 % (ref 42–52)
LYMPHOCYTES # BLD AUTO: 1.8 10*3/UL (ref 1.3–4.4)
LYMPHOCYTES NFR BLD AUTO: 9.7 % (ref 27–41)
MCH RBC QN AUTO: 27.6 PG (ref 27–31)
MCHC RBC AUTO-ENTMCNC: 30.1 G/DL (ref 33–37)
MCV RBC AUTO: 91.6 FL (ref 80–94)
MONOCYTES # BLD AUTO: 1 10*3/UL (ref 0.1–1)
MONOCYTES NFR BLD MANUAL: 5.2 % (ref 3–9)
NEUT #: 15.2 10*3/UL (ref 2.3–7.9)
NEUT %: 84.1 % (ref 47–73)
NRBC BLD QL AUTO: 0 10*3/UL (ref 0–0)
PCO2 BLDA: 57 MMHG (ref 35–45)
PCO2 BLDA: 65 MMHG (ref 35–45)
PCO2 BLDA: 66 MMHG (ref 35–45)
PCO2 BLDA: 70 MMHG (ref 35–45)
PCO2 BLDA: 72 MMHG (ref 35–45)
PH BLDA: 7.39 [PH] (ref 7.35–7.45)
PH BLDA: 7.39 [PH] (ref 7.35–7.45)
PH BLDA: 7.4 [PH] (ref 7.35–7.45)
PH BLDA: 7.43 [PH] (ref 7.35–7.45)
PH BLDA: 7.44 [PH] (ref 7.35–7.45)
PLATELET # BLD AUTO: 490 10*3/UL (ref 130–400)
PMV BLD AUTO: 11.8 FL (ref 9.6–12.3)
PO2 BLDA: 101 MMHG (ref 80–90)
PO2 BLDA: 114 MMHG (ref 80–90)
PO2 BLDA: 79 MMHG (ref 80–90)
PO2 BLDA: 83 MMHG (ref 80–90)
PO2 BLDA: 86 MMHG (ref 80–90)
POTASSIUM SERPL-SCNC: 4 MMOL/L (ref 3.5–5.1)
PROT SERPL-MCNC: 6 GM/DL (ref 6.4–8.2)
RBC # BLD AUTO: 2.61 10*6/UL (ref 4.5–5.9)
SAO2 % BLDA: 95 % (ref 95–97)
SAO2 % BLDA: 95 % (ref 95–97)
SAO2 % BLDA: 96 % (ref 95–97)
SAO2 % BLDA: 98 % (ref 95–97)
SAO2 % BLDA: 99 % (ref 95–97)
SODIUM SERPL-SCNC: 133 MMOL/L (ref 136–145)
WBC NRBC COR # BLD AUTO: 18.1 10*3/UL (ref 4.8–10.8)

## 2021-01-04 PROCEDURE — 5A09357 ASSISTANCE WITH RESPIRATORY VENTILATION, LESS THAN 24 CONSECUTIVE HOURS, CONTINUOUS POSITIVE AIRWAY PRESSURE: ICD-10-PCS | Performed by: INTERNAL MEDICINE

## 2021-01-04 NOTE — NUR
Patient extubated on physician's order. TO BIPAP AT 22/10 35%
Pulse oximeter on with alarms set at 10% below patient's
baseline. PATIENT PULSE OX REMAINS 95-98%
Pharyngeal reflex present prior to extubation.
Patient encouraged to cough and deep breathe.
Patient observed for skin color and temperature, monitor rhythm,
respiratory rate and effort, and level of consciousness.
NPO INDEFINETLY.
Patient tolerated procedure WELL.
PATIENT EDUCATED THROUGHOUT PROCESS AND NODDED APPROPRIATELY.
MITA MIRAMONTES

## 2021-01-04 NOTE — NUR
SPOKE WITH DTR NARCISA ABOUT EXTUBATION, AND IF HE WOULD NEED TO BE
REINTUBATED, THAT HE WOULD NEED TO BE TRACHED, DTR IS RECEPTIVE TO TRACHING PT
AND WOULD WANT HIM TRACHED IF NEEDED

## 2021-01-04 NOTE — NUR
PT EXTUBATED TO BIPAP 22/10 PER MD ORDER. PT APPERAS TO BE DALLAS WELL. SPO2 IS
MAINTAINING 98% ON 35% O2. NO COMPLAINTS OF SOB AND NO VISABLE SIGNS OF RES
DISTRESS. WILL CONTINUE TO MONITOR.

## 2021-01-04 NOTE — NUR
ABG RESULTS CALLED TO DR MOCTEZUMA BY RESPIRATORY AT THIS TIME. NO NEW ORDERS.
TOLERATING BIPAP WELL.

## 2021-01-05 VITALS — DIASTOLIC BLOOD PRESSURE: 67 MMHG | SYSTOLIC BLOOD PRESSURE: 118 MMHG

## 2021-01-05 VITALS — DIASTOLIC BLOOD PRESSURE: 58 MMHG

## 2021-01-05 VITALS — DIASTOLIC BLOOD PRESSURE: 71 MMHG | SYSTOLIC BLOOD PRESSURE: 123 MMHG

## 2021-01-05 VITALS — SYSTOLIC BLOOD PRESSURE: 97 MMHG | DIASTOLIC BLOOD PRESSURE: 61 MMHG

## 2021-01-05 VITALS — DIASTOLIC BLOOD PRESSURE: 60 MMHG | SYSTOLIC BLOOD PRESSURE: 107 MMHG

## 2021-01-05 VITALS — DIASTOLIC BLOOD PRESSURE: 65 MMHG | SYSTOLIC BLOOD PRESSURE: 125 MMHG

## 2021-01-05 VITALS — DIASTOLIC BLOOD PRESSURE: 68 MMHG

## 2021-01-05 VITALS — DIASTOLIC BLOOD PRESSURE: 75 MMHG | SYSTOLIC BLOOD PRESSURE: 129 MMHG

## 2021-01-05 VITALS — DIASTOLIC BLOOD PRESSURE: 55 MMHG

## 2021-01-05 VITALS — DIASTOLIC BLOOD PRESSURE: 71 MMHG

## 2021-01-05 VITALS — DIASTOLIC BLOOD PRESSURE: 77 MMHG | SYSTOLIC BLOOD PRESSURE: 132 MMHG

## 2021-01-05 VITALS — DIASTOLIC BLOOD PRESSURE: 69 MMHG | SYSTOLIC BLOOD PRESSURE: 121 MMHG

## 2021-01-05 VITALS — DIASTOLIC BLOOD PRESSURE: 54 MMHG | SYSTOLIC BLOOD PRESSURE: 91 MMHG

## 2021-01-05 VITALS — DIASTOLIC BLOOD PRESSURE: 63 MMHG

## 2021-01-05 VITALS — DIASTOLIC BLOOD PRESSURE: 62 MMHG

## 2021-01-05 VITALS — DIASTOLIC BLOOD PRESSURE: 54 MMHG | SYSTOLIC BLOOD PRESSURE: 92 MMHG

## 2021-01-05 VITALS — DIASTOLIC BLOOD PRESSURE: 67 MMHG

## 2021-01-05 VITALS — DIASTOLIC BLOOD PRESSURE: 68 MMHG | SYSTOLIC BLOOD PRESSURE: 122 MMHG

## 2021-01-05 VITALS — DIASTOLIC BLOOD PRESSURE: 52 MMHG

## 2021-01-05 VITALS — DIASTOLIC BLOOD PRESSURE: 64 MMHG

## 2021-01-05 VITALS — DIASTOLIC BLOOD PRESSURE: 57 MMHG | SYSTOLIC BLOOD PRESSURE: 99 MMHG

## 2021-01-05 VITALS — DIASTOLIC BLOOD PRESSURE: 50 MMHG | SYSTOLIC BLOOD PRESSURE: 77 MMHG

## 2021-01-05 VITALS — SYSTOLIC BLOOD PRESSURE: 102 MMHG | DIASTOLIC BLOOD PRESSURE: 61 MMHG

## 2021-01-05 VITALS — DIASTOLIC BLOOD PRESSURE: 65 MMHG

## 2021-01-05 VITALS — DIASTOLIC BLOOD PRESSURE: 62 MMHG | SYSTOLIC BLOOD PRESSURE: 95 MMHG

## 2021-01-05 VITALS — DIASTOLIC BLOOD PRESSURE: 54 MMHG | SYSTOLIC BLOOD PRESSURE: 88 MMHG

## 2021-01-05 VITALS — DIASTOLIC BLOOD PRESSURE: 56 MMHG | SYSTOLIC BLOOD PRESSURE: 99 MMHG

## 2021-01-05 VITALS — SYSTOLIC BLOOD PRESSURE: 91 MMHG | DIASTOLIC BLOOD PRESSURE: 53 MMHG

## 2021-01-05 VITALS — SYSTOLIC BLOOD PRESSURE: 95 MMHG | DIASTOLIC BLOOD PRESSURE: 57 MMHG

## 2021-01-05 VITALS — DIASTOLIC BLOOD PRESSURE: 56 MMHG | SYSTOLIC BLOOD PRESSURE: 90 MMHG

## 2021-01-05 VITALS — DIASTOLIC BLOOD PRESSURE: 70 MMHG | SYSTOLIC BLOOD PRESSURE: 124 MMHG

## 2021-01-05 VITALS — DIASTOLIC BLOOD PRESSURE: 66 MMHG | SYSTOLIC BLOOD PRESSURE: 113 MMHG

## 2021-01-05 VITALS — SYSTOLIC BLOOD PRESSURE: 110 MMHG | DIASTOLIC BLOOD PRESSURE: 63 MMHG

## 2021-01-05 VITALS — DIASTOLIC BLOOD PRESSURE: 66 MMHG

## 2021-01-05 VITALS — DIASTOLIC BLOOD PRESSURE: 77 MMHG

## 2021-01-05 VITALS — DIASTOLIC BLOOD PRESSURE: 69 MMHG

## 2021-01-05 VITALS — SYSTOLIC BLOOD PRESSURE: 132 MMHG | DIASTOLIC BLOOD PRESSURE: 77 MMHG

## 2021-01-05 VITALS — DIASTOLIC BLOOD PRESSURE: 56 MMHG

## 2021-01-05 VITALS — SYSTOLIC BLOOD PRESSURE: 90 MMHG | DIASTOLIC BLOOD PRESSURE: 56 MMHG

## 2021-01-05 VITALS — DIASTOLIC BLOOD PRESSURE: 61 MMHG | SYSTOLIC BLOOD PRESSURE: 96 MMHG

## 2021-01-05 VITALS — DIASTOLIC BLOOD PRESSURE: 70 MMHG

## 2021-01-05 VITALS — SYSTOLIC BLOOD PRESSURE: 89 MMHG | DIASTOLIC BLOOD PRESSURE: 51 MMHG

## 2021-01-05 VITALS — DIASTOLIC BLOOD PRESSURE: 57 MMHG

## 2021-01-05 VITALS — DIASTOLIC BLOOD PRESSURE: 67 MMHG | SYSTOLIC BLOOD PRESSURE: 123 MMHG

## 2021-01-05 VITALS — DIASTOLIC BLOOD PRESSURE: 74 MMHG

## 2021-01-05 VITALS — DIASTOLIC BLOOD PRESSURE: 79 MMHG

## 2021-01-05 VITALS — DIASTOLIC BLOOD PRESSURE: 54 MMHG

## 2021-01-05 VITALS — DIASTOLIC BLOOD PRESSURE: 79 MMHG | SYSTOLIC BLOOD PRESSURE: 136 MMHG

## 2021-01-05 VITALS — SYSTOLIC BLOOD PRESSURE: 101 MMHG | DIASTOLIC BLOOD PRESSURE: 56 MMHG

## 2021-01-05 VITALS — DIASTOLIC BLOOD PRESSURE: 61 MMHG

## 2021-01-05 LAB
ALBUMIN SERPL-MCNC: 3.7 GM/DL (ref 3.1–4.5)
ALP SERPL-CCNC: 49 U/L (ref 45–117)
ALT SERPL W P-5'-P-CCNC: 43 U/L (ref 12–78)
AST SERPL-CCNC: 40 IU/L (ref 3–35)
BASE EXCESS BLDA CALC-SCNC: 16.4 MMOL/L (ref -2–2)
BASE EXCESS BLDA CALC-SCNC: 20.1 MMOL/L (ref -2–2)
BUN SERPL-MCNC: 34 MG/DL (ref 7–24)
BUN SERPL-MCNC: 35 MG/DL (ref 7–24)
BUN SERPL-MCNC: 37 MG/DL (ref 7–24)
CHLORIDE SERPL-SCNC: 95 MMOL/L (ref 98–107)
CHLORIDE SERPL-SCNC: 98 MMOL/L (ref 98–107)
CHLORIDE SERPL-SCNC: 98 MMOL/L (ref 98–107)
CREAT SERPL-MCNC: 0.76 MG/DL (ref 0.7–1.3)
CREAT SERPL-MCNC: 0.79 MG/DL (ref 0.7–1.3)
CREAT SERPL-MCNC: 0.91 MG/DL (ref 0.7–1.3)
ERYTHROCYTE [DISTWIDTH] IN BLOOD BY AUTOMATED COUNT: 15.3 % (ref 0–14.5)
ERYTHROCYTE [DISTWIDTH] IN BLOOD BY AUTOMATED COUNT: 15.7 % (ref 0–14.5)
HCT VFR BLD AUTO: 23 % (ref 42–52)
HCT VFR BLD AUTO: 31.4 % (ref 42–52)
MCH RBC QN AUTO: 27.3 PG (ref 27–31)
MCH RBC QN AUTO: 27.9 PG (ref 27–31)
MCHC RBC AUTO-ENTMCNC: 28.7 G/DL (ref 33–37)
MCHC RBC AUTO-ENTMCNC: 29.1 G/DL (ref 33–37)
MCV RBC AUTO: 93.9 FL (ref 80–94)
MCV RBC AUTO: 97.2 FL (ref 80–94)
NRBC BLD QL AUTO: 0 % (ref 0–0)
NRBC BLD QL AUTO: 0 10*3/UL (ref 0–0)
OVALOCYTES BLD QL SMEAR: (no result)
PCO2 BLDA: 75 MMHG (ref 35–45)
PCO2 BLDA: 77 MMHG (ref 35–45)
PH BLDA: 7.37 [PH] (ref 7.35–7.45)
PH BLDA: 7.42 [PH] (ref 7.35–7.45)
PLATELET # BLD AUTO: 362 10*3/UL (ref 130–400)
PLATELET # BLD AUTO: 377 10*3/UL (ref 130–400)
PLATELET SUFFICIENCY: NORMAL
PLATELET SUFFICIENCY: NORMAL
PMV BLD AUTO: 11.9 FL (ref 9.6–12.3)
PMV BLD AUTO: 12.1 FL (ref 9.6–12.3)
PO2 BLDA: 163 MMHG (ref 80–90)
PO2 BLDA: 95 MMHG (ref 80–90)
POTASSIUM SERPL-SCNC: 3.1 MMOL/L (ref 3.5–5.1)
POTASSIUM SERPL-SCNC: 5 MMOL/L (ref 3.5–5.1)
POTASSIUM SERPL-SCNC: 5 MMOL/L (ref 3.5–5.1)
PROT SERPL-MCNC: 5.8 GM/DL (ref 6.4–8.2)
RBC # BLD AUTO: 2.45 10*6/UL (ref 4.5–5.9)
RBC # BLD AUTO: 3.23 10*6/UL (ref 4.5–5.9)
SAO2 % BLDA: 97 % (ref 95–97)
SAO2 % BLDA: 99 % (ref 95–97)
SCHISTOCYTES BLD QL SMEAR: (no result)
SODIUM SERPL-SCNC: 141 MMOL/L (ref 136–145)
SODIUM SERPL-SCNC: 142 MMOL/L (ref 136–145)
SODIUM SERPL-SCNC: 142 MMOL/L (ref 136–145)
STOMATOCYTES BLD QL SMEAR: (no result)
TOTAL CELLS COUNTED: 100 #CELLS
TOTAL CELLS COUNTED: 100 #CELLS
WBC NRBC COR # BLD AUTO: 13.6 10*3/UL (ref 4.8–10.8)
WBC NRBC COR # BLD AUTO: 21.9 10*3/UL (ref 4.8–10.8)

## 2021-01-05 PROCEDURE — 5A09357 ASSISTANCE WITH RESPIRATORY VENTILATION, LESS THAN 24 CONSECUTIVE HOURS, CONTINUOUS POSITIVE AIRWAY PRESSURE: ICD-10-PCS | Performed by: INTERNAL MEDICINE

## 2021-01-05 PROCEDURE — 30233N1 TRANSFUSION OF NONAUTOLOGOUS RED BLOOD CELLS INTO PERIPHERAL VEIN, PERCUTANEOUS APPROACH: ICD-10-PCS | Performed by: INTERNAL MEDICINE

## 2021-01-05 PROCEDURE — 4A133J1 MONITORING OF ARTERIAL PULSE, PERIPHERAL, PERCUTANEOUS APPROACH: ICD-10-PCS | Performed by: NURSE ANESTHETIST, CERTIFIED REGISTERED

## 2021-01-05 PROCEDURE — 03HY32Z INSERTION OF MONITORING DEVICE INTO UPPER ARTERY, PERCUTANEOUS APPROACH: ICD-10-PCS | Performed by: NURSE ANESTHETIST, CERTIFIED REGISTERED

## 2021-01-05 PROCEDURE — 4A133B1 MONITORING OF ARTERIAL PRESSURE, PERIPHERAL, PERCUTANEOUS APPROACH: ICD-10-PCS | Performed by: NURSE ANESTHETIST, CERTIFIED REGISTERED

## 2021-01-05 NOTE — NUR
patient is positive covid, extubated on bipap, patient is short term skilled
at [facility] and will return when discharged. case management
will follow

## 2021-01-05 NOTE — NUR
2130 FIRST DOSE OF DIAMOX GIVEN AS PER ORDER DR. MOCTEZUMA FOR ELEVATED C02 ON BMP.
BIPAP REMOVED AND ORAL CARE DONE.

## 2021-01-05 NOTE — NUR
1930 RESTING IN BED WITH HOB ELEVATED. ON LEFT SIDE. REMAINS NPO. TF
MAINTAINED VIA OGT. PLACEMENT CONFIRMED WITH AIR BOLUS/AUSCULTATION. ORAL CARE
DONE. APPEARS TO BE SLEEPING. OPENS EYES TO NAME. NO DISTRESS NOTED. LIJ MLC
INTACT. RR ART LINE INTACT. ZEROED AND CALIBRATED WITH GOOOD WAVEFORM AND
DYNAMIC RESPONSE. BARRETT PATENT AND DRAINING CLEAR YELLOW URINE. ISOLATION
CONT.

## 2021-01-06 VITALS — DIASTOLIC BLOOD PRESSURE: 73 MMHG | SYSTOLIC BLOOD PRESSURE: 107 MMHG

## 2021-01-06 VITALS — DIASTOLIC BLOOD PRESSURE: 74 MMHG | SYSTOLIC BLOOD PRESSURE: 121 MMHG

## 2021-01-06 VITALS — DIASTOLIC BLOOD PRESSURE: 75 MMHG | SYSTOLIC BLOOD PRESSURE: 124 MMHG

## 2021-01-06 VITALS — DIASTOLIC BLOOD PRESSURE: 70 MMHG

## 2021-01-06 VITALS — DIASTOLIC BLOOD PRESSURE: 76 MMHG

## 2021-01-06 LAB
ALBUMIN SERPL-MCNC: 3.6 GM/DL (ref 3.1–4.5)
ALP SERPL-CCNC: 58 U/L (ref 45–117)
ALT SERPL W P-5'-P-CCNC: 46 U/L (ref 12–78)
AST SERPL-CCNC: 38 IU/L (ref 3–35)
BASE EXCESS BLDA CALC-SCNC: 12.6 MMOL/L (ref -2–2)
BASOPHILS # BLD AUTO: 0 10*3/UL (ref 0–0.1)
BASOPHILS NFR BLD AUTO: 0.1 % (ref 0–1)
BUN SERPL-MCNC: 33 MG/DL (ref 7–24)
CHLORIDE SERPL-SCNC: 99 MMOL/L (ref 98–107)
CREAT SERPL-MCNC: 0.68 MG/DL (ref 0.7–1.3)
EOSINOPHIL # BLD AUTO: 0 % (ref 1–4)
EOSINOPHIL # BLD AUTO: 0 10*3/UL (ref 0–0.4)
ERYTHROCYTE [DISTWIDTH] IN BLOOD BY AUTOMATED COUNT: 15.6 % (ref 0–14.5)
HCT VFR BLD AUTO: 30.2 % (ref 42–52)
LYMPHOCYTES # BLD AUTO: 1.3 10*3/UL (ref 1.3–4.4)
LYMPHOCYTES NFR BLD AUTO: 7.7 % (ref 27–41)
MCH RBC QN AUTO: 27.5 PG (ref 27–31)
MCHC RBC AUTO-ENTMCNC: 28.1 G/DL (ref 33–37)
MCV RBC AUTO: 97.7 FL (ref 80–94)
MONOCYTES # BLD AUTO: 1.3 10*3/UL (ref 0.1–1)
MONOCYTES NFR BLD MANUAL: 7.5 % (ref 3–9)
NEUT #: 14.2 10*3/UL (ref 2.3–7.9)
NEUT %: 84.2 % (ref 47–73)
NRBC BLD QL AUTO: 0 % (ref 0–0)
PCO2 BLDA: 70 MMHG (ref 35–45)
PH BLDA: 7.37 [PH] (ref 7.35–7.45)
PLATELET # BLD AUTO: 435 10*3/UL (ref 130–400)
PMV BLD AUTO: 12.2 FL (ref 9.6–12.3)
PO2 BLDA: 108 MMHG (ref 80–90)
POTASSIUM SERPL-SCNC: 3.9 MMOL/L (ref 3.5–5.1)
PROT SERPL-MCNC: 6.1 GM/DL (ref 6.4–8.2)
RBC # BLD AUTO: 3.09 10*6/UL (ref 4.5–5.9)
SAO2 % BLDA: 98 % (ref 95–97)
SODIUM SERPL-SCNC: 145 MMOL/L (ref 136–145)
WBC NRBC COR # BLD AUTO: 16.9 10*3/UL (ref 4.8–10.8)

## 2021-01-06 PROCEDURE — 5A09357 ASSISTANCE WITH RESPIRATORY VENTILATION, LESS THAN 24 CONSECUTIVE HOURS, CONTINUOUS POSITIVE AIRWAY PRESSURE: ICD-10-PCS | Performed by: INTERNAL MEDICINE

## 2021-01-06 NOTE — NUR
0600 INCONTINENT OF MODERATE AMOUNT LIQUID, BROWN STOOL. JANY CARE DONE AND
PADS CHANGED. REPOSITIONED ON BACK. TF CONT VIA OGT. BARRETT PATENT. CONDITION
GUARDED.

## 2021-01-06 NOTE — NUR
1900 PULSE OX 77%. PT HAD TAKEN NC OFF. PLACED BACK ON BIPAP. PULSE OX UP TO
97% AFTER THIS. RESTING IN BED WITH HOB ELEVATED. SIDE RAILS UP X'S 2. CALL
LIGHT IN REACH. [facility] INTACT. RR ARTLINE INTACT. ZEROED AND CALIBRATED WITH
GOOD WAVEFORM AND DYNAMIC RESPONSE. NENA PATENT. ISOLATION CONT. NO DISTRESS
NOTED.

## 2021-01-07 ENCOUNTER — HOSPITAL ENCOUNTER (INPATIENT)
Dept: HOSPITAL 83 - 4E | Age: 56
LOS: 2 days | DRG: 871 | End: 2021-01-09
Attending: INTERNAL MEDICINE | Admitting: INTERNAL MEDICINE
Payer: COMMERCIAL

## 2021-01-07 VITALS — SYSTOLIC BLOOD PRESSURE: 77 MMHG | DIASTOLIC BLOOD PRESSURE: 51 MMHG

## 2021-01-07 VITALS — BODY MASS INDEX: 28.06 KG/M2 | WEIGHT: 196 LBS | HEIGHT: 70 IN

## 2021-01-07 VITALS — DIASTOLIC BLOOD PRESSURE: 69 MMHG | SYSTOLIC BLOOD PRESSURE: 98 MMHG

## 2021-01-07 VITALS — DIASTOLIC BLOOD PRESSURE: 48 MMHG | SYSTOLIC BLOOD PRESSURE: 63 MMHG

## 2021-01-07 VITALS — DIASTOLIC BLOOD PRESSURE: 73 MMHG

## 2021-01-07 VITALS — DIASTOLIC BLOOD PRESSURE: 65 MMHG

## 2021-01-07 VITALS — SYSTOLIC BLOOD PRESSURE: 91 MMHG | DIASTOLIC BLOOD PRESSURE: 46 MMHG

## 2021-01-07 VITALS — DIASTOLIC BLOOD PRESSURE: 72 MMHG

## 2021-01-07 DIAGNOSIS — J41.1: ICD-10-CM

## 2021-01-07 DIAGNOSIS — A41.9: Primary | ICD-10-CM

## 2021-01-07 DIAGNOSIS — Z51.5: ICD-10-CM

## 2021-01-07 DIAGNOSIS — J96.20: ICD-10-CM

## 2021-01-07 DIAGNOSIS — N17.0: ICD-10-CM

## 2021-01-07 DIAGNOSIS — E43: ICD-10-CM

## 2021-01-07 DIAGNOSIS — U07.1: ICD-10-CM

## 2021-01-07 DIAGNOSIS — G93.41: ICD-10-CM

## 2021-01-07 LAB
ALBUMIN SERPL-MCNC: 3.6 GM/DL (ref 3.1–4.5)
ALP SERPL-CCNC: 67 U/L (ref 45–117)
ALT SERPL W P-5'-P-CCNC: 46 U/L (ref 12–78)
AST SERPL-CCNC: 29 IU/L (ref 3–35)
BASE EXCESS BLDA CALC-SCNC: 5.6 MMOL/L (ref -2–2)
BASE EXCESS BLDA CALC-SCNC: 7.8 MMOL/L (ref -2–2)
BUN SERPL-MCNC: 40 MG/DL (ref 7–24)
CHLORIDE SERPL-SCNC: 103 MMOL/L (ref 98–107)
CREAT SERPL-MCNC: 1.01 MG/DL (ref 0.7–1.3)
ERYTHROCYTE [DISTWIDTH] IN BLOOD BY AUTOMATED COUNT: 15.1 % (ref 0–14.5)
HCT VFR BLD AUTO: 34.7 % (ref 42–52)
MACROCYTES BLD QL SMEAR: SLIGHT
MCH RBC QN AUTO: 27.8 PG (ref 27–31)
MCHC RBC AUTO-ENTMCNC: 27.1 G/DL (ref 33–37)
MCV RBC AUTO: 102.7 FL (ref 80–94)
NRBC BLD QL AUTO: 0 % (ref 0–0)
PCO2 BLDA: 108 MMHG (ref 35–45)
PCO2 BLDA: 112 MMHG (ref 35–45)
PH BLDA: 7.15 [PH] (ref 7.35–7.45)
PH BLDA: 7.17 [PH] (ref 7.35–7.45)
PLATELET # BLD AUTO: 477 10*3/UL (ref 130–400)
PLATELET SUFFICIENCY: (no result)
PMV BLD AUTO: 12.2 FL (ref 9.6–12.3)
PO2 BLDA: 323 MMHG (ref 80–90)
PO2 BLDA: 60 MMHG (ref 80–90)
POTASSIUM SERPL-SCNC: 4.1 MMOL/L (ref 3.5–5.1)
PROT SERPL-MCNC: 6.7 GM/DL (ref 6.4–8.2)
RBC # BLD AUTO: 3.38 10*6/UL (ref 4.5–5.9)
SAO2 % BLDA: 86 % (ref 95–97)
SAO2 % BLDA: 99 % (ref 95–97)
SODIUM SERPL-SCNC: 143 MMOL/L (ref 136–145)
STOMATOCYTES BLD QL SMEAR: (no result)
TOTAL CELLS COUNTED: 100 #CELLS
WBC NRBC COR # BLD AUTO: 34.5 10*3/UL (ref 4.8–10.8)

## 2021-01-07 PROCEDURE — 5A09357 ASSISTANCE WITH RESPIRATORY VENTILATION, LESS THAN 24 CONSECUTIVE HOURS, CONTINUOUS POSITIVE AIRWAY PRESSURE: ICD-10-PCS | Performed by: INTERNAL MEDICINE

## 2021-01-07 NOTE — NUR
Time: 2130
A 55  year old MALE admitted to 
under services of SYMONE GIRON DO.
 Chief complaint: [facility].
 
VENECIA PEARSON

## 2021-01-07 NOTE — NUR
RECEIVED HOSPICE RECOMMENDATIONS. PATIENT TO BE DISCHARGED AT THIS TIME TO [facility]
HOSPICE. NOTIFIED DR. MALDONADO OF RECOMMENDATIONS AND THAT THE COMFORT CARE
PAPER WORK STILL NEEDED SIGNED BY A PHYSICAN. STATED HE WOULD BE UP.

## 2021-01-07 NOTE — NUR
DTR CALLED IN. INFORMED OF FATHERS DETIORATING CONDITION. SHE WILL FIND A
SITTER AND LET US KNOW WHEN SHE IS COMING IN.

## 2021-01-07 NOTE — NUR
DR. BENAVIDES CALLED AND NOTIFIED THAT DR. MOCTEZUMA IS AWARE OF CHANGE IN PT
CONDITION AND NEED FOR SOMETHING FOR PT TO EASE RAPID RESPIRATIONS.

## 2021-01-07 NOTE — NUR
0240 PULSE OX DROPPING. RESP THERAPY HERE. PULSE OX 82% % 02 ON BIPAP.
ABG'S BEING DRAWN. DR. BENAVIDES CALLED TO COME SEE PT.

## 2021-01-07 NOTE — NUR
0132 LOPRESSOR 2.5MG IV GOVEN FOR HR . SEE CHART FOR STRIPS.
0208 HR IS . [facility] P[facility]SE O[facility] 96%. PT
PT RESP APPEAR LABORED. REPOSITIONED ON BACK.

## 2021-01-07 NOTE — NUR
PT HR . RR IS 28. PULSE OX IS 96% AND BP /76. DR. BENAVIDES CALLED
WITH ELEVATED HR AND ALSO THAT PT WAS ON TOPROL XL 50MG PO BID DAILY.

## 2021-01-07 NOTE — NUR
LSW SPOKE WITH PATIENTS DAUGHTER TORRI ABOUT HOSPICE. SHE DID NOT HAVE A
PREFERENCE. LSW FAXED REFERRAL TO [facility] FOR REVIEW. LSW
MAKE RN JASSI AWARE.

## 2021-01-07 NOTE — NUR
0000 INCONTINENT OF MODERATE AM[place] BR[place] LIQUID STOOL. JANY CARE DONE AND
LINENS CHANGED. REPOSITIONED ON RIGHT SIDE. HOB ELEVATED. SIDE RAILS UP X'S 2.
PULSE OX 96% WITH BIPAP INTACT.

## 2021-01-07 NOTE — NUR
0530 DTR HERE. PT CONDITION DISCUSSED WITH HER AT LENGTH. DR. VIVAS HERE AND
TALKED WITH DTR.
0600 PT IS A DNR-CC. MEDS ONLY.PROPER PAPERS SIGNED PER DR. VIVAS/KENNEDY.
DTR IS SITTING AT BEDSIDE. WITH PT. PT IS
UNRESPONSIVE AT PRESENT. HR . BP REMAINS LOW AT 68/51. ART LINE IS
LEAKING AT PRESENT. WAVE FORM IS INTACT. BARRETT PATENT. CONDITION SERIOUS.

## 2021-01-07 NOTE — NUR
PULSE OX IS NOW 80% % 02 VIA BIPAP. HR . PT DOES NOT RESPOND TO
VERBAL STIMULI. AWITING ABG RESULTS TO CALL DR. MOCTEZUMA

## 2021-01-07 NOTE — NUR
CALLED PT FATHER. HE IS ELDERLY AND VERY [place]. UNABLE TO GIVEN US ANY OTHER WAY
TO REACH. PT DTR. SUPERVISOR STILL ATTEMPTING TO REACH DTR.

## 2021-01-07 NOTE — NUR
REPOSITIONED ON BACK. BIPAP REMOVED TO GIVE PT A DRINK. HAS DIFFICULTY
FOLLOWING COMMANDS. DESATS TO 85% WHILE TAKING A DRINK. WILL CONT TO MONITOR.
HR IS BACK UP .  CHANGED RECTAL PROBE. RECTAL TEMP IS 98.4

## 2021-01-07 NOTE — NUR
0409 STILL NO RETURN CALL FROM PT DTR.
ATIVAN 0.5MG IV GIVEN AS ORDERED FOR PT COMOFRT. LOWERED BP IMMEDIATELY. BP IS
72/54 NOW. . WILL CONT TO MONITOR. PULSE OX 96%.

## 2021-01-07 NOTE — NUR
HR , PULSE OX 93%, BP /82. PULSE OX 94%. WILL CONT TO MONITOR.
AWAITING CALL BACK FROM POLICE AFTER THEY GO TO PT DTR HOUSE.

## 2021-01-07 NOTE — NUR
DR. VIVAS TALKING ON PHONE WITH DR. COWAN REGARDING PT CODE STATUS. ABG
RESULTS BACK AND DR. VIVAS AWARE. PULSE OX 79%. STILL ATTEMPTING TO REACH.
DTR. SUPERVISOR AWARE. GIVEN PT ADDRESS. WILL SEND POLICE TO DTR HOUSE SINCE
NO ANSWER ON HER CELL PHONE AFTER CALLING LISTED PHONE NUMBER CONTINUOUSLY.

## 2021-01-07 NOTE — NUR
DR. COWAN INSTRUCTED DR. VIVAS THAT PT IS A NO INTUBATION AND WAS VERY
ADAMANT EARLIER TODAY ABOUT BEING A NO INTUBATION AND NOT PUT BACK ON THE
VENT.

## 2021-01-08 VITALS — DIASTOLIC BLOOD PRESSURE: 21 MMHG

## 2021-01-08 VITALS — DIASTOLIC BLOOD PRESSURE: 36 MMHG | SYSTOLIC BLOOD PRESSURE: 72 MMHG

## 2021-01-08 NOTE — NUR
MORPHINE GTT CHANGED OVER TO NEW BAG AT THIS TIME. WASTE INTO WALL DISPENSER,
WITNESSED AND SIGNED WITH LYNN CALDWELL RN.

## 2021-01-09 VITALS — SYSTOLIC BLOOD PRESSURE: 53 MMHG | DIASTOLIC BLOOD PRESSURE: 35 MMHG

## 2021-01-09 VITALS — DIASTOLIC BLOOD PRESSURE: 31 MMHG

## 2021-01-09 VITALS — SYSTOLIC BLOOD PRESSURE: 56 MMHG | DIASTOLIC BLOOD PRESSURE: 34 MMHG

## 2021-01-09 VITALS — DIASTOLIC BLOOD PRESSURE: 91 MMHG

## 2021-01-09 VITALS — DIASTOLIC BLOOD PRESSURE: 18 MMHG

## 2021-01-09 NOTE — NUR
NOTIFIED DR. VIVAS THAT PATIENT HAS . PER DR. VIVAS, DR BATRES TO
SIGN THE DEATH CERTIFICATE. SUPERVISOR ALSO MADE AWARE.

## 2021-01-09 NOTE — NUR
NOTIFIED GUSTAVO OF PATIENT PASSING AWAY. PER NARCISA ARRANGEMENTS HAVE
BEEN MADE WITH [facility] [place].